# Patient Record
Sex: MALE | Race: WHITE | NOT HISPANIC OR LATINO | Employment: FULL TIME | URBAN - METROPOLITAN AREA
[De-identification: names, ages, dates, MRNs, and addresses within clinical notes are randomized per-mention and may not be internally consistent; named-entity substitution may affect disease eponyms.]

---

## 2017-01-06 ENCOUNTER — OFFICE VISIT (OUTPATIENT)
Dept: INTERNAL MEDICINE | Facility: CLINIC | Age: 42
End: 2017-01-06
Payer: COMMERCIAL

## 2017-01-06 VITALS
TEMPERATURE: 98 F | WEIGHT: 229.06 LBS | DIASTOLIC BLOOD PRESSURE: 94 MMHG | SYSTOLIC BLOOD PRESSURE: 120 MMHG | OXYGEN SATURATION: 96 % | HEART RATE: 92 BPM | BODY MASS INDEX: 28.48 KG/M2 | HEIGHT: 75 IN

## 2017-01-06 DIAGNOSIS — R03.0 ELEVATED BLOOD PRESSURE (NOT HYPERTENSION): ICD-10-CM

## 2017-01-06 DIAGNOSIS — J40 BRONCHITIS: Primary | ICD-10-CM

## 2017-01-06 PROCEDURE — 1159F MED LIST DOCD IN RCRD: CPT | Mod: S$GLB,,, | Performed by: PHYSICIAN ASSISTANT

## 2017-01-06 PROCEDURE — 99214 OFFICE O/P EST MOD 30 MIN: CPT | Mod: S$GLB,,, | Performed by: PHYSICIAN ASSISTANT

## 2017-01-06 PROCEDURE — 99999 PR PBB SHADOW E&M-EST. PATIENT-LVL III: CPT | Mod: PBBFAC,,, | Performed by: PHYSICIAN ASSISTANT

## 2017-01-06 RX ORDER — TRAMADOL HYDROCHLORIDE 50 MG/1
50 TABLET ORAL EVERY 6 HOURS PRN
COMMUNITY
End: 2017-03-27 | Stop reason: SDUPTHER

## 2017-01-06 RX ORDER — PREDNISONE 10 MG/1
TABLET ORAL
Qty: 18 TABLET | Refills: 0 | Status: SHIPPED | OUTPATIENT
Start: 2017-01-06 | End: 2017-06-20

## 2017-01-06 RX ORDER — PROMETHAZINE HYDROCHLORIDE AND DEXTROMETHORPHAN HYDROBROMIDE 6.25; 15 MG/5ML; MG/5ML
5 SYRUP ORAL 3 TIMES DAILY PRN
Qty: 240 ML | Refills: 0 | Status: SHIPPED | OUTPATIENT
Start: 2017-01-06 | End: 2017-01-16

## 2017-01-06 NOTE — PATIENT INSTRUCTIONS
Controlling High Blood Pressure  High blood pressure (hypertension) is often called the silent killer. This is because many people who have it dont know it. High blood pressure is defined as 140/90 mm Hg or higher. Know your blood pressure and remember to check it regularly. Doing so can save your life. Here are some things you can do to help control your blood pressure.    Choose heart-healthy foods  · Select low-salt, low-fat foods. Limit sodium intake to 2,400 mg per day or the amount suggested by your healthcare provider.  · Limit canned, dried, cured, packaged, and fast foods. These can contain a lot of salt.  · Eat 8 to 10 servings of fruits and vegetables every day.  · Choose lean meats, fish, or chicken.  · Eat whole-grain pasta, brown rice, and beans.  · Eat 2 to 3 servings of low-fat or fat-free dairy products.  · Ask your doctor about the DASH eating plan. This plan helps reduce blood pressure.  · When you go to a restaurant, ask that your meal be prepared with no added salt.  Maintain a healthy weight  · Ask your healthcare provider how many calories to eat a day. Then stick to that number.  · Ask your healthcare provider what weight range is healthiest for you. If you are overweight, a weight loss of only 3% to 5% of your body weight can help lower blood pressure. Generally, a good weight loss goal is to lose 10% of your body weight in a year.  · Limit snacks and sweets.  · Get regular exercise.  Get up and get active  · Choose activities you enjoy. Find ones you can do with friends or family. This includes bicycling, dancing, walking, and jogging.  · Park farther away from building entrances.  · Use stairs instead of the elevator.  · When you can, walk or bike instead of driving.  · [place] leaves, garden, or do household repairs.  · Be active at a moderate to vigorous level of physical activity for at least 40 minutes for a minimum of 3 to 4 days a week.   Manage stress  · Make time to relax and  enjoy life. Find time to laugh.  · Communicate your concerns with your loved ones and your healthcare provider.  · Visit with family and friends, and keep up with hobbies.  Limit alcohol and quit smoking  · Men should have no more than 2 drinks per day.  · Women should have no more than 1 drink per day.  · Talk with your healthcare provider about quitting smoking. Smoking significantly increases your risk for heart disease and stroke. Ask your healthcare provider about community smoking cessation programs and other options.  Medicines  If lifestyle changes arent enough, your healthcare provider may prescribe high blood pressure medicine. Take all medicines as prescribed. If you have any questions about your medicines, ask your healthcare provider before stopping or changing them.   © 8762-9283 [employer]. [address] [place] [address], [place] [address]. All rights reserved. This information is not intended as a substitute for professional medical care. Always follow your healthcare professional's instructions.          Long-Term Complications of Diabetes  Diabetes can cause health problems over time. These are called complications. They are more likely to occur if your blood sugar is often too high. Over time, high blood sugar can damage blood vessels in your body. It is important to keep your blood sugar in your target range. This can help prevent or delay complications from diabetes.    Possible complications  Complications of diabetes include:  · Eye problems, including damage to the blood vessels in the eyes (retinopathy), pressure in the eye (glaucoma), and clouding of the eyes lens (a cataract). Eye problems can eventually lead to irreversible blindness.   · Tooth and gum problems (periodontal disease), causing loss of teeth and bone  · Blood vessel (vascular) disease leading to circulation problems, heart attack or stroke, or a need for amputation of a limb   · Problems with sexual function leading  to erectile dysfunction in men and sexual discomfort in women   · Kidney disease (nephropathy) can eventually lead to kidney failure, which may require dialysis or kidney transplant   · Nerve problems (neuropathy), causing pain or loss of feeling in your feet and other parts of your body, potentially leading to an amputation of a limb   · High blood pressure (hypertension), putting strain on your heart and blood vessels  · Serious infections, possibly leading to loss of toes, feet, or limbs  How to avoid complications  The serious consequences of these complications are completely avoidable for most people with diabetes by managing your blood glucose, blood pressure, and cholesterol levels. This can help you feel better and stay healthy. You can manage diabetes by tracking your blood sugar. You can also eat healthy and exercise. And you should take medication if directed by your health care provider.  © 8343-0317 [employer]. [address] [place] [address], [place] [address]. All rights reserved. This information is not intended as a substitute for professional medical care. Always follow your healthcare professional's instructions.

## 2017-01-06 NOTE — PROGRESS NOTES
Subjective:       Patient ID: Douglas Escobar is a 41 y.o. male.    Chief Complaint: URI    URI    This is a new problem. The current episode started in the past 7 days. The problem has been unchanged. There has been no fever. Associated symptoms include congestion and coughing. Pertinent negatives include no abdominal pain, chest pain, rhinorrhea, sneezing, sore throat, swollen glands or wheezing. He has tried nothing for the symptoms.     Review of Systems   Constitutional: Positive for fatigue. Negative for chills, diaphoresis and fever.   HENT: Positive for congestion. Negative for postnasal drip, rhinorrhea, sinus pressure, sneezing and sore throat.    Respiratory: Positive for cough. Negative for chest tightness, shortness of breath and wheezing.    Cardiovascular: Negative for chest pain.   Gastrointestinal: Negative for abdominal pain.       Objective:      Physical Exam   Constitutional: He appears well-developed and well-nourished. No distress.   HENT:   Head: Normocephalic and atraumatic.   Right Ear: Tympanic membrane and ear canal normal.   Left Ear: Tympanic membrane and ear canal normal.   Nose: No mucosal edema or rhinorrhea. Right sinus exhibits no maxillary sinus tenderness. Left sinus exhibits no maxillary sinus tenderness.   Neck: Neck supple.   Cardiovascular: Normal rate and regular rhythm.  Exam reveals no gallop and no friction rub.    No murmur heard.  Pulmonary/Chest: Effort normal. No respiratory distress. He has no wheezes. He has rales. He exhibits no tenderness.   Lymphadenopathy:     He has no cervical adenopathy.   Skin: He is not diaphoretic.   Nursing note and vitals reviewed.      Assessment:       1. Bronchitis    2. Elevated blood pressure (not hypertension)        Plan:       Bronchitis    Elevated blood pressure (not hypertension)    Other orders  -     promethazine-dextromethorphan (PROMETHAZINE-DM) 6.25-15 mg/5 mL Syrp; Take 5 mLs by mouth 3 (three) times daily as needed.   Dispense: 240 mL; Refill: 0  -     predniSONE (DELTASONE) 10 MG tablet; Take 3 daily for 3 days, then 2 daily for three days, then 1 daily for three days.  Dispense: 18 tablet; Refill: 0

## 2017-01-06 NOTE — MR AVS SNAPSHOT
[facility]  [address] [place] [address]  [place] [address]  Phone: [phone]  Fax: [phone]                  Douglas Escobar   2017 11:40 AM   Office Visit    Description:  Male : 1975   Provider:  Servando Abbott III, PA-C   Department:  [facility]           Reason for Visit     URI           Diagnoses this Visit        Comments    Bronchitis    -  Primary     Elevated blood pressure (not hypertension)                To Do List           Goals (5 Years of Data)     None       These Medications        Disp Refills Start End    promethazine-dextromethorphan (PROMETHAZINE-DM) 6.25-15 mg/5 mL Syrp 240 mL 0 2017    Take 5 mLs by mouth 3 (three) times daily as needed. - Oral    Pharmacy: [facility] [place] - [address] [facility] [address] Ph #: [phone]       predniSONE (DELTASONE) 10 MG tablet 18 tablet 0 2017     Take 3 daily for 3 days, then 2 daily for three days, then 1 daily for three days.    Pharmacy: [facility] [place] - [address] Ph #: [phone]         [facility]     [facility] Line -  Assistance  Registered nurses in the [facility] provide clinical advisement, health education, appointment booking, and other advisory services.  Call for this free service at [phone].             Medications           Message regarding Medications     Verify the changes and/or additions to your medication regime listed below are the same as discussed with your clinician today.  If any of these changes or additions are incorrect, please notify your healthcare provider.        START taking these NEW medications        Refills    promethazine-dextromethorphan (PROMETHAZINE-DM) 6.25-15 mg/5 mL Syrp 0    Sig: Take 5 mLs by mouth 3 (three) times daily as needed.    Class: Normal    Route: Oral    predniSONE (DELTASONE) 10 MG tablet 0    Sig: Take 3 daily for 3 days, then 2  "daily for three days, then 1 daily for three days.    Class: Normal      STOP taking these medications     DM/PSEUDOEPHED/ACETAMINOPHEN (VICKS DAYQUIL ORAL) Take by mouth.           Verify that the below list of medications is an accurate representation of the medications you are currently taking.  If none reported, the list may be blank. If incorrect, please contact your healthcare provider. Carry this list with you in case of emergency.           Current Medications     predniSONE (DELTASONE) 10 MG tablet Take 3 daily for 3 days, then 2 daily for three days, then 1 daily for three days.    promethazine-dextromethorphan (PROMETHAZINE-DM) 6.25-15 mg/5 mL Syrp Take 5 mLs by mouth 3 (three) times daily as needed.    tramadol (ULTRAM) 50 mg tablet Take 50 mg by mouth every 6 (six) hours as needed for Pain.           Clinical Reference Information           Vital Signs - Last Recorded  Most recent update: 1/6/2017 11:31 AM by Madina Melchor    BP Pulse Temp Ht Wt SpO2    (!) 120/94 (BP Location: Left arm, Patient Position: Sitting) 92 97.5 °F (36.4 °C) (Tympanic) 6' 3" (1.905 m) 103.9 kg (229 lb 0.9 oz) 96%    BMI                28.63 kg/m2          Blood Pressure          Most Recent Value    BP  (!)  120/94      Allergies as of 1/6/2017     No Known Allergies      Immunizations Administered on Date of Encounter - 1/6/2017     None      Instructions        Controlling High Blood Pressure  High blood pressure (hypertension) is often called the silent killer. This is because many people who have it dont know it. High blood pressure is defined as 140/90 mm Hg or higher. Know your blood pressure and remember to check it regularly. Doing so can save your life. Here are some things you can do to help control your blood pressure.    Choose heart-healthy foods  · Select low-salt, low-fat foods. Limit sodium intake to 2,400 mg per day or the amount suggested by your healthcare provider.  · Limit canned, dried, cured, packaged, " and fast foods. These can contain a lot of salt.  · Eat 8 to 10 servings of fruits and vegetables every day.  · Choose lean meats, fish, or chicken.  · Eat whole-grain pasta, brown rice, and beans.  · Eat 2 to 3 servings of low-fat or fat-free dairy products.  · Ask your doctor about the DASH eating plan. This plan helps reduce blood pressure.  · When you go to a restaurant, ask that your meal be prepared with no added salt.  Maintain a healthy weight  · Ask your healthcare provider how many calories to eat a day. Then stick to that number.  · Ask your healthcare provider what weight range is healthiest for you. If you are overweight, a weight loss of only 3% to 5% of your body weight can help lower blood pressure. Generally, a good weight loss goal is to lose 10% of your body weight in a year.  · Limit snacks and sweets.  · Get regular exercise.  Get up and get active  · Choose activities you enjoy. Find ones you can do with friends or family. This includes bicycling, dancing, walking, and jogging.  · Park farther away from building entrances.  · Use stairs instead of the elevator.  · When you can, walk or bike instead of driving.  · [place] leaves, garden, or do household repairs.  · Be active at a moderate to vigorous level of physical activity for at least 40 minutes for a minimum of 3 to 4 days a week.   Manage stress  · Make time to relax and enjoy life. Find time to laugh.  · Communicate your concerns with your loved ones and your healthcare provider.  · Visit with family and friends, and keep up with hobbies.  Limit alcohol and quit smoking  · Men should have no more than 2 drinks per day.  · Women should have no more than 1 drink per day.  · Talk with your healthcare provider about quitting smoking. Smoking significantly increases your risk for heart disease and stroke. Ask your healthcare provider about community smoking cessation programs and other options.  Medicines  If lifestyle changes arent enough,  your healthcare provider may prescribe high blood pressure medicine. Take all medicines as prescribed. If you have any questions about your medicines, ask your healthcare provider before stopping or changing them.   © 1255-6269 [employer]. [address] [place] [address], [place] [address]. All rights reserved. This information is not intended as a substitute for professional medical care. Always follow your healthcare professional's instructions.          Long-Term Complications of Diabetes  Diabetes can cause health problems over time. These are called complications. They are more likely to occur if your blood sugar is often too high. Over time, high blood sugar can damage blood vessels in your body. It is important to keep your blood sugar in your target range. This can help prevent or delay complications from diabetes.    Possible complications  Complications of diabetes include:  · Eye problems, including damage to the blood vessels in the eyes (retinopathy), pressure in the eye (glaucoma), and clouding of the eyes lens (a cataract). Eye problems can eventually lead to irreversible blindness.   · Tooth and gum problems (periodontal disease), causing loss of teeth and bone  · Blood vessel (vascular) disease leading to circulation problems, heart attack or stroke, or a need for amputation of a limb   · Problems with sexual function leading to erectile dysfunction in men and sexual discomfort in women   · Kidney disease (nephropathy) can eventually lead to kidney failure, which may require dialysis or kidney transplant   · Nerve problems (neuropathy), causing pain or loss of feeling in your feet and other parts of your body, potentially leading to an amputation of a limb   · High blood pressure (hypertension), putting strain on your heart and blood vessels  · Serious infections, possibly leading to loss of toes, feet, or limbs  How to avoid complications  The serious consequences of these complications are  completely avoidable for most people with diabetes by managing your blood glucose, blood pressure, and cholesterol levels. This can help you feel better and stay healthy. You can manage diabetes by tracking your blood sugar. You can also eat healthy and exercise. And you should take medication if directed by your health care provider.  © 3580-0612 [employer], [employer]. [address] [place] [address], [place] [address]. All rights reserved. This information is not intended as a substitute for professional medical care. Always follow your healthcare professional's instructions.

## 2017-01-09 ENCOUNTER — PATIENT MESSAGE (OUTPATIENT)
Dept: INTERNAL MEDICINE | Facility: CLINIC | Age: 42
End: 2017-01-09

## 2017-03-26 ENCOUNTER — PATIENT MESSAGE (OUTPATIENT)
Dept: INTERNAL MEDICINE | Facility: CLINIC | Age: 42
End: 2017-03-26

## 2017-03-27 RX ORDER — TRAMADOL HYDROCHLORIDE 50 MG/1
50 TABLET ORAL EVERY 6 HOURS PRN
Qty: 40 TABLET | Refills: 0 | Status: SHIPPED | OUTPATIENT
Start: 2017-03-27 | End: 2017-06-20 | Stop reason: SDUPTHER

## 2017-06-20 ENCOUNTER — OFFICE VISIT (OUTPATIENT)
Dept: INTERNAL MEDICINE | Facility: CLINIC | Age: 42
End: 2017-06-20
Payer: COMMERCIAL

## 2017-06-20 VITALS
TEMPERATURE: 97 F | SYSTOLIC BLOOD PRESSURE: 124 MMHG | BODY MASS INDEX: 28.92 KG/M2 | DIASTOLIC BLOOD PRESSURE: 80 MMHG | WEIGHT: 232.56 LBS | HEART RATE: 68 BPM | OXYGEN SATURATION: 98 % | HEIGHT: 75 IN
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
## 2017-06-20 DIAGNOSIS — M54.50 CHRONIC LEFT-SIDED LOW BACK PAIN WITHOUT SCIATICA: ICD-10-CM

## 2017-06-20 DIAGNOSIS — S39.92XD INJURY OF BACK DUE TO FALL, SUBSEQUENT ENCOUNTER: Primary | ICD-10-CM

## 2017-06-20 DIAGNOSIS — G89.29 CHRONIC LEFT-SIDED LOW BACK PAIN WITHOUT SCIATICA: ICD-10-CM

## 2017-06-20 PROCEDURE — 99999 PR PBB SHADOW E&M-EST. PATIENT-LVL III: CPT | Mod: PBBFAC,,, | Performed by: FAMILY MEDICINE

## 2017-06-20 PROCEDURE — 99213 OFFICE O/P EST LOW 20 MIN: CPT | Mod: S$GLB,,, | Performed by: FAMILY MEDICINE

## 2017-06-20 RX ORDER — TRAMADOL HYDROCHLORIDE 50 MG/1
50 TABLET ORAL EVERY 6 HOURS PRN
Qty: 40 TABLET | Refills: 1 | Status: SHIPPED | OUTPATIENT
Start: 2017-06-20 | End: 2019-06-03

## 2017-06-20 NOTE — PROGRESS NOTES
Subjective:       Patient ID: Douglas Escobar is a 41 y.o. male.    Chief Complaint: Back Pain and Medication Refill    HPI HPI below from October he still has pain.     Back Pain: Patient presents for presents evaluation of low back problems.  Symptoms have been present for several years and include pain in lower back from fall.  (aching and throbbing in character; 6/10 in severity). 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 Symptoms are worst: evening. Alleviating factors identifiable by patient are none. Exacerbating factors identifiable by patient are sitting and standing. Treatments so far initiated by patient: pain meds many years ago Previous lower back problems: After fall had bulging lumbar disc and in sacral area.    Old injury from falling 20 years ago. Sacral and 2 lumbar disc protruding. Past month and a half has been constant. Always aches but toward end of the day hard to walk and move and sleep. Darvocet initially but pt doesn't like taking medication. Took them a few months but then starting to like them.   Aleve helped as one point but not for the past couple months. Took a 10 oxycontin from mom but it made him loopy. Pain relieved.       Tramadol helped and he states he doesn't need the muscle relaxer. Pain is not as bad this time he has episodes that come and go. Sometimes he can't walk for a week but then it gets better.   He has been getting some tingling in both legs at night usually when he sits down. This has been going on for past 2 months.     He has had multiple test done when he first had his injury. He would like to avoid surgery for as long as possible.      Review of Systems   Constitutional: Negative for activity change, appetite change, fatigue and fever.   HENT: Negative for congestion, ear pain, facial swelling, hearing loss, sore throat and tinnitus.    Eyes: Negative for redness and visual disturbance.   Respiratory: Negative for cough, chest tightness  and wheezing.    Gastrointestinal: Negative for abdominal distention, abdominal pain, constipation, diarrhea, nausea and vomiting.   Endocrine: Negative for polydipsia and polyuria.   Genitourinary: Negative for discharge, flank pain and frequency.   Musculoskeletal: Positive for back pain. Negative for gait problem and joint swelling.   Skin: Negative for rash.   Neurological: Negative for dizziness, tremors, seizures, weakness and headaches.   Psychiatric/Behavioral: Negative for agitation and confusion.         Objective:        Physical Exam    Constitutional: He is oriented to person, place, and time. He appears well-nourished. He does not appear ill.   HENT:   Head: Normocephalic and atraumatic.   Right Ear: External ear normal.   Left Ear: External ear normal.   Eyes: EOM are normal. Pupils are equal, round, and reactive to light. Right eye exhibits no discharge. Left eye exhibits no discharge. No scleral icterus.   Neck: Normal range of motion. Neck supple. No thyromegaly present.   Cardiovascular: Normal rate, regular rhythm and normal heart sounds.    No murmur heard.  Pulmonary/Chest: Effort normal and breath sounds normal. No respiratory distress. He has no wheezes.   Abdominal: Soft. Bowel sounds are normal. He exhibits no distension. There is no tenderness.   Musculoskeletal: Normal range of motion. He exhibits no edema.   Neurological: He is alert and oriented to person, place, and time. He has normal reflexes. Coordination normal.   Skin: Skin is warm. No rash noted. No erythema.   Assessment/Plan:     Injury of back due to fall, subsequent encounter  -     tramadol (ULTRAM) 50 mg tablet; Take 1 tablet (50 mg total) by mouth every 6 (six) hours as needed for Pain.  Dispense: 40 tablet; Refill: 1    Chronic left-sided low back pain without sciatica  -     tramadol (ULTRAM) 50 mg tablet; Take 1 tablet (50 mg total) by mouth every 6 (six) hours as needed for Pain.  Dispense: 40 tablet; Refill: 1    Will  follow up in regards to tingling of lower extremities. Discussed MRI         Return if symptoms worsen or fail to improve.    Namrata Williamson MD  [facility]

## 2019-06-03 ENCOUNTER — OFFICE VISIT (OUTPATIENT)
Dept: FAMILY MEDICINE | Facility: CLINIC | Age: 44
End: 2019-06-03
Payer: COMMERCIAL

## 2019-06-03 VITALS
SYSTOLIC BLOOD PRESSURE: 154 MMHG | HEART RATE: 80 BPM | BODY MASS INDEX: 29.71 KG/M2 | DIASTOLIC BLOOD PRESSURE: 99 MMHG | HEIGHT: 76 IN | WEIGHT: 244 LBS | TEMPERATURE: 98 F

## 2019-06-03 DIAGNOSIS — J06.9 UPPER RESPIRATORY TRACT INFECTION, UNSPECIFIED TYPE: Primary | ICD-10-CM

## 2019-06-03 PROCEDURE — 99999 PR PBB SHADOW E&M-EST. PATIENT-LVL III: CPT | Mod: PBBFAC,,, | Performed by: NURSE PRACTITIONER

## 2019-06-03 PROCEDURE — 3008F BODY MASS INDEX DOCD: CPT | Mod: CPTII,S$GLB,, | Performed by: NURSE PRACTITIONER

## 2019-06-03 PROCEDURE — 3008F PR BODY MASS INDEX (BMI) DOCUMENTED: ICD-10-PCS | Mod: CPTII,S$GLB,, | Performed by: NURSE PRACTITIONER

## 2019-06-03 PROCEDURE — 99999 PR PBB SHADOW E&M-EST. PATIENT-LVL III: ICD-10-PCS | Mod: PBBFAC,,, | Performed by: NURSE PRACTITIONER

## 2019-06-03 PROCEDURE — 99213 PR OFFICE/OUTPT VISIT, EST, LEVL III, 20-29 MIN: ICD-10-PCS | Mod: S$GLB,,, | Performed by: NURSE PRACTITIONER

## 2019-06-03 PROCEDURE — 99213 OFFICE O/P EST LOW 20 MIN: CPT | Mod: S$GLB,,, | Performed by: NURSE PRACTITIONER

## 2019-06-03 RX ORDER — PREDNISONE 20 MG/1
20 TABLET ORAL 2 TIMES DAILY
Qty: 10 TABLET | Refills: 0 | Status: SHIPPED | OUTPATIENT
Start: 2019-06-03 | End: 2019-06-08

## 2019-06-03 NOTE — PROGRESS NOTES
Subjective:       Patient ID: Douglas Escobar is a 43 y.o. male.    Chief Complaint: Nasal Congestion    URI    This is a new problem. The current episode started 1 to 4 weeks ago. The problem has been unchanged. There has been no fever. Associated symptoms include congestion, coughing, rhinorrhea and sinus pain. Pertinent negatives include no abdominal pain, chest pain, diarrhea, dysuria, ear pain, headaches, nausea, rash, sore throat or vomiting. The treatment provided no relief.       Review of Systems   Constitutional: Negative for fatigue, fever and unexpected weight change.   HENT: Positive for congestion, rhinorrhea and sinus pain. Negative for ear pain and sore throat.    Eyes: Negative.  Negative for pain and visual disturbance.   Respiratory: Positive for cough. Negative for shortness of breath.    Cardiovascular: Negative for chest pain and palpitations.   Gastrointestinal: Negative for abdominal pain, diarrhea, nausea and vomiting.   Genitourinary: Negative for dysuria and frequency.   Musculoskeletal: Negative for arthralgias and myalgias.   Skin: Negative for color change and rash.   Neurological: Negative for dizziness and headaches.   Psychiatric/Behavioral: Negative for sleep disturbance. The patient is not nervous/anxious.        Vitals:    06/03/19 1513   BP: (!) 154/99   Pulse: 80   Temp: 98.4 °F (36.9 °C)       Objective:     Current Outpatient Medications   Medication Sig Dispense Refill    predniSONE (DELTASONE) 20 MG tablet Take 1 tablet (20 mg total) by mouth 2 (two) times daily. for 5 days 10 tablet 0     No current facility-administered medications for this visit.        Physical Exam   Constitutional: He is oriented to person, place, and time. He appears well-developed. No distress.   HENT:   Head: Normocephalic and atraumatic.   Right Ear: Tympanic membrane normal.   Left Ear: Tympanic membrane normal.   Nose: Mucosal edema present.   Mouth/Throat: Posterior oropharyngeal edema present.    Eyes: Pupils are equal, round, and reactive to light. EOM are normal.   Neck: Normal range of motion. Neck supple.   Cardiovascular: Normal rate and regular rhythm.   Pulmonary/Chest: Effort normal and breath sounds normal.   Musculoskeletal: Normal range of motion.   Neurological: He is alert and oriented to person, place, and time.   Skin: Skin is warm and dry. No rash noted.   Psychiatric: He has a normal mood and affect. Thought content normal.   Nursing note and vitals reviewed.      Assessment:       1. Upper respiratory tract infection, unspecified type        Plan:   Upper respiratory tract infection, unspecified type    Other orders  -     predniSONE (DELTASONE) 20 MG tablet; Take 1 tablet (20 mg total) by mouth 2 (two) times daily. for 5 days  Dispense: 10 tablet; Refill: 0        Follow up if symptoms worsen or fail to improve.    There are no Patient Instructions on file for this visit.

## 2019-06-12 ENCOUNTER — PATIENT MESSAGE (OUTPATIENT)
Dept: FAMILY MEDICINE | Facility: CLINIC | Age: 44
End: 2019-06-12

## 2019-06-14 RX ORDER — AZITHROMYCIN 250 MG/1
TABLET, FILM COATED ORAL
Qty: 6 TABLET | Refills: 0 | Status: SHIPPED | OUTPATIENT
Start: 2019-06-14 | End: 2019-06-19

## 2023-01-05 DIAGNOSIS — M25.511 RIGHT SHOULDER PAIN, UNSPECIFIED CHRONICITY: Primary | ICD-10-CM

## 2023-01-06 ENCOUNTER — OFFICE VISIT (OUTPATIENT)
Dept: ORTHOPEDICS | Facility: CLINIC | Age: 48
End: 2023-01-06
Payer: COMMERCIAL

## 2023-01-06 ENCOUNTER — HOSPITAL ENCOUNTER (OUTPATIENT)
Dept: RADIOLOGY | Facility: HOSPITAL | Age: 48
Discharge: HOME | End: 2023-01-06
Attending: ORTHOPAEDIC SURGERY
Payer: COMMERCIAL

## 2023-01-06 VITALS — BODY MASS INDEX: 29.71 KG/M2 | WEIGHT: 244 LBS | HEIGHT: 76 IN

## 2023-01-06 DIAGNOSIS — M25.511 RIGHT SHOULDER PAIN, UNSPECIFIED CHRONICITY: Primary | ICD-10-CM

## 2023-01-06 DIAGNOSIS — M25.511 RIGHT SHOULDER PAIN, UNSPECIFIED CHRONICITY: ICD-10-CM

## 2023-01-06 PROCEDURE — 73030 X-RAY EXAM OF SHOULDER: CPT | Mod: 26,RT,, | Performed by: RADIOLOGY

## 2023-01-06 PROCEDURE — 73030 XR SHOULDER TRAUMA 3 VIEW RIGHT: ICD-10-PCS | Mod: 26,RT,, | Performed by: RADIOLOGY

## 2023-01-06 PROCEDURE — 3008F PR BODY MASS INDEX (BMI) DOCUMENTED: ICD-10-PCS | Mod: CPTII,S$GLB,, | Performed by: ORTHOPAEDIC SURGERY

## 2023-01-06 PROCEDURE — 1159F MED LIST DOCD IN RCRD: CPT | Mod: CPTII,S$GLB,, | Performed by: ORTHOPAEDIC SURGERY

## 2023-01-06 PROCEDURE — 99203 PR OFFICE/OUTPT VISIT, NEW, LEVL III, 30-44 MIN: ICD-10-PCS | Mod: S$GLB,,, | Performed by: ORTHOPAEDIC SURGERY

## 2023-01-06 PROCEDURE — 99203 OFFICE O/P NEW LOW 30 MIN: CPT | Mod: S$GLB,,, | Performed by: ORTHOPAEDIC SURGERY

## 2023-01-06 PROCEDURE — 1160F RVW MEDS BY RX/DR IN RCRD: CPT | Mod: CPTII,S$GLB,, | Performed by: ORTHOPAEDIC SURGERY

## 2023-01-06 PROCEDURE — 3008F BODY MASS INDEX DOCD: CPT | Mod: CPTII,S$GLB,, | Performed by: ORTHOPAEDIC SURGERY

## 2023-01-06 PROCEDURE — 1160F PR REVIEW ALL MEDS BY PRESCRIBER/CLIN PHARMACIST DOCUMENTED: ICD-10-PCS | Mod: CPTII,S$GLB,, | Performed by: ORTHOPAEDIC SURGERY

## 2023-01-06 PROCEDURE — 1159F PR MEDICATION LIST DOCUMENTED IN MEDICAL RECORD: ICD-10-PCS | Mod: CPTII,S$GLB,, | Performed by: ORTHOPAEDIC SURGERY

## 2023-01-06 PROCEDURE — 99999 PR PBB SHADOW E&M-EST. PATIENT-LVL III: CPT | Mod: PBBFAC,,, | Performed by: ORTHOPAEDIC SURGERY

## 2023-01-06 PROCEDURE — 73030 X-RAY EXAM OF SHOULDER: CPT | Mod: TC,PO,RT

## 2023-01-06 PROCEDURE — 99999 PR PBB SHADOW E&M-EST. PATIENT-LVL III: ICD-10-PCS | Mod: PBBFAC,,, | Performed by: ORTHOPAEDIC SURGERY

## 2023-01-06 NOTE — PROGRESS NOTES
47 years old right shoulder pain for about 6 months time no trauma come on gradually worse with overhead activity reaching behind his back 3 on good days 7 on bad days.  Does have pain nighttime lies on that side     Exam shows cervical motion does not reproduce symptoms, positive Neer Olguin impingement sign, cuff strength intact, tender the AC joint, positive speed's and Woodson's test     X-rays show AC arthrosis    Assessment:  Right shoulder AC arthrosis rotator cuff tendinitis and impingement     Plan:  We offered him injection as well as physical therapy, wants to try a home exercise program, follow-up as needed

## 2023-02-03 ENCOUNTER — OFFICE VISIT (OUTPATIENT)
Dept: ORTHOPEDICS | Facility: CLINIC | Age: 48
End: 2023-02-03
Payer: COMMERCIAL

## 2023-02-03 ENCOUNTER — HOSPITAL ENCOUNTER (OUTPATIENT)
Dept: RADIOLOGY | Facility: HOSPITAL | Age: 48
Discharge: HOME | End: 2023-02-03
Attending: PHYSICIAN ASSISTANT
Payer: COMMERCIAL

## 2023-02-03 VITALS
WEIGHT: 235.44 LBS | DIASTOLIC BLOOD PRESSURE: 108 MMHG | HEIGHT: 76 IN | BODY MASS INDEX: 28.67 KG/M2 | SYSTOLIC BLOOD PRESSURE: 149 MMHG | HEART RATE: 77 BPM

## 2023-02-03 DIAGNOSIS — M25.562 ACUTE PAIN OF LEFT KNEE: Primary | ICD-10-CM

## 2023-02-03 DIAGNOSIS — M17.12 PRIMARY OSTEOARTHRITIS OF LEFT KNEE: ICD-10-CM

## 2023-02-03 DIAGNOSIS — M25.562 ACUTE PAIN OF LEFT KNEE: ICD-10-CM

## 2023-02-03 PROCEDURE — 3077F SYST BP >= 140 MM HG: CPT | Mod: CPTII,S$GLB,, | Performed by: PHYSICIAN ASSISTANT

## 2023-02-03 PROCEDURE — 3008F BODY MASS INDEX DOCD: CPT | Mod: CPTII,S$GLB,, | Performed by: PHYSICIAN ASSISTANT

## 2023-02-03 PROCEDURE — 1159F PR MEDICATION LIST DOCUMENTED IN MEDICAL RECORD: ICD-10-PCS | Mod: CPTII,S$GLB,, | Performed by: PHYSICIAN ASSISTANT

## 2023-02-03 PROCEDURE — 73560 X-RAY EXAM OF KNEE 1 OR 2: CPT | Mod: TC,RT

## 2023-02-03 PROCEDURE — 73560 X-RAY EXAM OF KNEE 1 OR 2: CPT | Mod: 26,RT,, | Performed by: RADIOLOGY

## 2023-02-03 PROCEDURE — 99213 PR OFFICE/OUTPT VISIT, EST, LEVL III, 20-29 MIN: ICD-10-PCS | Mod: S$GLB,,, | Performed by: PHYSICIAN ASSISTANT

## 2023-02-03 PROCEDURE — 99213 OFFICE O/P EST LOW 20 MIN: CPT | Mod: S$GLB,,, | Performed by: PHYSICIAN ASSISTANT

## 2023-02-03 PROCEDURE — 73562 X-RAY EXAM OF KNEE 3: CPT | Mod: 26,LT,, | Performed by: RADIOLOGY

## 2023-02-03 PROCEDURE — 1159F MED LIST DOCD IN RCRD: CPT | Mod: CPTII,S$GLB,, | Performed by: PHYSICIAN ASSISTANT

## 2023-02-03 PROCEDURE — 3008F PR BODY MASS INDEX (BMI) DOCUMENTED: ICD-10-PCS | Mod: CPTII,S$GLB,, | Performed by: PHYSICIAN ASSISTANT

## 2023-02-03 PROCEDURE — 3080F PR MOST RECENT DIASTOLIC BLOOD PRESSURE >= 90 MM HG: ICD-10-PCS | Mod: CPTII,S$GLB,, | Performed by: PHYSICIAN ASSISTANT

## 2023-02-03 PROCEDURE — 1160F PR REVIEW ALL MEDS BY PRESCRIBER/CLIN PHARMACIST DOCUMENTED: ICD-10-PCS | Mod: CPTII,S$GLB,, | Performed by: PHYSICIAN ASSISTANT

## 2023-02-03 PROCEDURE — 3077F PR MOST RECENT SYSTOLIC BLOOD PRESSURE >= 140 MM HG: ICD-10-PCS | Mod: CPTII,S$GLB,, | Performed by: PHYSICIAN ASSISTANT

## 2023-02-03 PROCEDURE — 73560 XR KNEE ORTHO LEFT: ICD-10-PCS | Mod: 26,RT,, | Performed by: RADIOLOGY

## 2023-02-03 PROCEDURE — 73562 XR KNEE ORTHO LEFT: ICD-10-PCS | Mod: 26,LT,, | Performed by: RADIOLOGY

## 2023-02-03 PROCEDURE — 3080F DIAST BP >= 90 MM HG: CPT | Mod: CPTII,S$GLB,, | Performed by: PHYSICIAN ASSISTANT

## 2023-02-03 PROCEDURE — 99999 PR PBB SHADOW E&M-EST. PATIENT-LVL III: CPT | Mod: PBBFAC,,, | Performed by: PHYSICIAN ASSISTANT

## 2023-02-03 PROCEDURE — 99999 PR PBB SHADOW E&M-EST. PATIENT-LVL III: ICD-10-PCS | Mod: PBBFAC,,, | Performed by: PHYSICIAN ASSISTANT

## 2023-02-03 PROCEDURE — 1160F RVW MEDS BY RX/DR IN RCRD: CPT | Mod: CPTII,S$GLB,, | Performed by: PHYSICIAN ASSISTANT

## 2023-02-03 RX ORDER — MELOXICAM 15 MG/1
15 TABLET ORAL DAILY
Qty: 30 TABLET | Refills: 1 | Status: SHIPPED | OUTPATIENT
Start: 2023-02-03 | End: 2023-03-05

## 2023-02-03 NOTE — PROGRESS NOTES
SUBJECTIVE:     Chief Complaint & History of Present Illness:  Douglas Escobar is a Established patient 47 y.o. male who is seen here today with a complaint of    Chief Complaint   Patient presents with    Left Knee - Pain    .  Patient is here today for evaluation treatment of sudden onset pain and tenderness in the medial aspect of the left knee.  Does not remember a specific trauma or injury to the knee but does work quite regularly bending lifting and climbing states pain began after he had been kneeling for an extended period of time working on his 4 rodarte.  Felt a tight sensation in the knee since that time he has had multiple episodes of medial joint line pain and giving way.  He has not fallen to the ground but states that every time he rotates laterally to the left has a sensation of giving way in his knee not had any significant swelling in the area and the pain usually abates after 5 or 10 minutes.  He is taken 1 or 2 intermittent doses of NSAIDs with no appreciable relief.  He is not had any weakness or decrease in range of motion  On a scale of 1-10, with 10 being worst pain imaginable, he rates this pain as 1 on good days and 9 on bad days.  he describes the pain as tender.    Review of patient's allergies indicates:  No Known Allergies      Current Outpatient Medications   Medication Sig Dispense Refill    meloxicam (MOBIC) 15 MG tablet Take 1 tablet (15 mg total) by mouth once daily. 30 tablet 1     No current facility-administered medications for this visit.       No past medical history on file.    Past Surgical History:   Procedure Laterality Date    HERNIA REPAIR         Vital Signs (Most Recent)  Vitals:    02/03/23 0920   BP: (!) 149/108   Pulse: 77           Review of Systems:  ROS:  Constitutional: no fever or chills  Eyes: no visual changes  ENT: no nasal congestion or sore throat  Respiratory: no cough or shortness of breath  Cardiovascular: no chest pain or  "palpitations  Gastrointestinal: no nausea or vomiting, tolerating diet  Genitourinary: no hematuria or dysuria  Integument/Breast: no rash or pruritis  Hematologic/Lymphatic: no easy bruising or lymphadenopathy  Musculoskeletal: no arthralgias or myalgias  Neurological: no seizures or tremors  Behavioral/Psych: no auditory or visual hallucinations  Endocrine: no heat or cold intolerance                OBJECTIVE:     PHYSICAL EXAM:  Height: 6' 4" (193 cm) Weight: 106.8 kg (235 lb 7.2 oz), General Appearance: Well nourished, well developed, in no acute distress.  Neurological: Mood & affect are normal.    left  Knee Exam:  Knee Range of Motion:0-125 degrees flexion   Effusion:none  Condition of skin:intact  Location of tenderness:Medial joint line   Strength:5 of 5  Stability:  Lachman: stable, LCL: stable, MCL: stable, PCL: stable, and posteromedial (dial): stable  Varus /Valgus stress:  normal  Maxine:   Positive Medial/Positive Medial    right  Knee Exam:  Knee Range of Motion:0-125 degrees flexion   Effusion:none  Condition of skin:intact  Location of tenderness:None   Strength:5 of 5  Stability:  Lachman: stable, LCL: stable, MCL: stable, PCL: stable, and posteromedial (dial): stable  Varus /Valgus stress:  normal  Maxine:   negative/negative      Hip Examination:  full painless range of motion, without tenderness    RADIOGRAPHS:  X-rays the knees taken today films reviewed by me demonstrate no evidence of fracture dislocation joint spaces well maintained bilaterally no evidence of osteophytic spurring sclerotic changes are advanced degenerative joint disease    ASSESSMENT/PLAN:       ICD-10-CM ICD-9-CM   1. Acute pain of left knee  M25.562 719.46   2. Primary osteoarthritis of left knee  M17.12 715.16       Plan: We discussed with the patient at length all the different treatment options available for  the knee including anti-inflammatories, acetaminophen, rest, ice, knee strengthening exercise, occasional " cortisone injections for temporary relief, Viscosupplimentation injections, arthroscopic debridement osteotomy, and finally knee arthroplasty.   Patient like to begin with conservative care  Meloxicam 15 mg q.d. with food times 10 days followed by p.r.n.  Try to avoid lateral pressures throughout the day  Ice and elevation as needed  Follow-up in 2 weeks if symptoms not significantly improved consider injection therapies sooner symptoms dictate

## 2023-03-30 ENCOUNTER — PATIENT MESSAGE (OUTPATIENT)
Dept: ORTHOPEDICS | Facility: CLINIC | Age: 48
End: 2023-03-30
Payer: COMMERCIAL

## 2023-04-04 DIAGNOSIS — M25.362 INSTABILITY OF LEFT KNEE JOINT: Primary | ICD-10-CM

## 2023-04-04 DIAGNOSIS — M25.562 ACUTE PAIN OF LEFT KNEE: ICD-10-CM

## 2023-04-10 ENCOUNTER — HOSPITAL ENCOUNTER (OUTPATIENT)
Dept: RADIOLOGY | Facility: HOSPITAL | Age: 48
Discharge: HOME | End: 2023-04-10
Attending: PHYSICIAN ASSISTANT
Payer: COMMERCIAL

## 2023-04-10 DIAGNOSIS — M25.362 INSTABILITY OF LEFT KNEE JOINT: ICD-10-CM

## 2023-04-10 DIAGNOSIS — M25.562 ACUTE PAIN OF LEFT KNEE: ICD-10-CM

## 2023-04-10 PROCEDURE — 73721 MRI JNT OF LWR EXTRE W/O DYE: CPT | Mod: TC,LT

## 2023-04-10 PROCEDURE — 73721 MRI JNT OF LWR EXTRE W/O DYE: CPT | Mod: 26,LT,, | Performed by: RADIOLOGY

## 2023-04-10 PROCEDURE — 73721 MRI KNEE WITHOUT CONTRAST LEFT: ICD-10-PCS | Mod: 26,LT,, | Performed by: RADIOLOGY

## 2023-04-18 ENCOUNTER — PATIENT MESSAGE (OUTPATIENT)
Dept: ORTHOPEDICS | Facility: CLINIC | Age: 48
End: 2023-04-18
Payer: COMMERCIAL

## 2023-04-20 ENCOUNTER — OFFICE VISIT (OUTPATIENT)
Dept: ORTHOPEDICS | Facility: CLINIC | Age: 48
End: 2023-04-20
Payer: COMMERCIAL

## 2023-04-20 VITALS — HEART RATE: 85 BPM | SYSTOLIC BLOOD PRESSURE: 155 MMHG | DIASTOLIC BLOOD PRESSURE: 109 MMHG

## 2023-04-20 DIAGNOSIS — M25.562 ACUTE PAIN OF LEFT KNEE: Primary | ICD-10-CM

## 2023-04-20 DIAGNOSIS — M17.12 PRIMARY OSTEOARTHRITIS OF LEFT KNEE: ICD-10-CM

## 2023-04-20 DIAGNOSIS — M25.362 INSTABILITY OF LEFT KNEE JOINT: ICD-10-CM

## 2023-04-20 PROCEDURE — 1160F RVW MEDS BY RX/DR IN RCRD: CPT | Mod: CPTII,S$GLB,, | Performed by: PHYSICIAN ASSISTANT

## 2023-04-20 PROCEDURE — 20610 PR DRAIN/INJECT LARGE JOINT/BURSA: ICD-10-PCS | Mod: LT,S$GLB,, | Performed by: PHYSICIAN ASSISTANT

## 2023-04-20 PROCEDURE — 99213 OFFICE O/P EST LOW 20 MIN: CPT | Mod: 25,S$GLB,, | Performed by: PHYSICIAN ASSISTANT

## 2023-04-20 PROCEDURE — 1159F MED LIST DOCD IN RCRD: CPT | Mod: CPTII,S$GLB,, | Performed by: PHYSICIAN ASSISTANT

## 2023-04-20 PROCEDURE — 99999 PR PBB SHADOW E&M-EST. PATIENT-LVL III: CPT | Mod: PBBFAC,,, | Performed by: PHYSICIAN ASSISTANT

## 2023-04-20 PROCEDURE — 99213 PR OFFICE/OUTPT VISIT, EST, LEVL III, 20-29 MIN: ICD-10-PCS | Mod: 25,S$GLB,, | Performed by: PHYSICIAN ASSISTANT

## 2023-04-20 PROCEDURE — 3080F PR MOST RECENT DIASTOLIC BLOOD PRESSURE >= 90 MM HG: ICD-10-PCS | Mod: CPTII,S$GLB,, | Performed by: PHYSICIAN ASSISTANT

## 2023-04-20 PROCEDURE — 3077F PR MOST RECENT SYSTOLIC BLOOD PRESSURE >= 140 MM HG: ICD-10-PCS | Mod: CPTII,S$GLB,, | Performed by: PHYSICIAN ASSISTANT

## 2023-04-20 PROCEDURE — 99999 PR PBB SHADOW E&M-EST. PATIENT-LVL III: ICD-10-PCS | Mod: PBBFAC,,, | Performed by: PHYSICIAN ASSISTANT

## 2023-04-20 PROCEDURE — 1160F PR REVIEW ALL MEDS BY PRESCRIBER/CLIN PHARMACIST DOCUMENTED: ICD-10-PCS | Mod: CPTII,S$GLB,, | Performed by: PHYSICIAN ASSISTANT

## 2023-04-20 PROCEDURE — 3080F DIAST BP >= 90 MM HG: CPT | Mod: CPTII,S$GLB,, | Performed by: PHYSICIAN ASSISTANT

## 2023-04-20 PROCEDURE — 3077F SYST BP >= 140 MM HG: CPT | Mod: CPTII,S$GLB,, | Performed by: PHYSICIAN ASSISTANT

## 2023-04-20 PROCEDURE — 1159F PR MEDICATION LIST DOCUMENTED IN MEDICAL RECORD: ICD-10-PCS | Mod: CPTII,S$GLB,, | Performed by: PHYSICIAN ASSISTANT

## 2023-04-20 PROCEDURE — 20610 DRAIN/INJ JOINT/BURSA W/O US: CPT | Mod: LT,S$GLB,, | Performed by: PHYSICIAN ASSISTANT

## 2023-04-20 RX ORDER — BETAMETHASONE SODIUM PHOSPHATE AND BETAMETHASONE ACETATE 3; 3 MG/ML; MG/ML
6 INJECTION, SUSPENSION INTRA-ARTICULAR; INTRALESIONAL; INTRAMUSCULAR; SOFT TISSUE
Status: COMPLETED | OUTPATIENT
Start: 2023-04-20 | End: 2023-04-20

## 2023-04-20 RX ADMIN — BETAMETHASONE SODIUM PHOSPHATE AND BETAMETHASONE ACETATE 6 MG: 3; 3 INJECTION, SUSPENSION INTRA-ARTICULAR; INTRALESIONAL; INTRAMUSCULAR; SOFT TISSUE at 08:04

## 2023-04-20 NOTE — PROGRESS NOTES
SUBJECTIVE:     Chief Complaint & History of Present Illness:  Douglas Escobar is a Established patient 47 y.o. male who is seen here today with a complaint of    Chief Complaint   Patient presents with    Left Knee - Pain    .  He is patient well-known to me was last seen treated the clinic for this condition 02/03/2023.  He has undergone an MRI which demonstrated n truncation of the inner edge of the meniscal body without evidence of wolf tear.  With this in mind patient has come in for therapeutic cortisone injection.  He does still have some episodes of instability and soreness in the knee.   On a scale of 1-10, with 10 being worst pain imaginable, he rates this pain as 2 on good days and 7 on bad days.  he describes the pain as sore and achy.    Review of patient's allergies indicates:  No Known Allergies      No current outpatient medications on file.     Current Facility-Administered Medications   Medication Dose Route Frequency Provider Last Rate Last Admin    betamethasone acetate-betamethasone sodium phosphate injection 6 mg  6 mg Intra-articular 1 time in Clinic/HOD Wiliam Gonzalez PA-C           History reviewed. No pertinent past medical history.    Past Surgical History:   Procedure Laterality Date    HERNIA REPAIR         Vital Signs (Most Recent)  Vitals:    04/20/23 0814   BP: (!) 155/109   Pulse: 85           Review of Systems:  ROS:  Constitutional: no fever or chills  Eyes: no visual changes  ENT: no nasal congestion or sore throat  Respiratory: no cough or shortness of breath  Cardiovascular: no chest pain or palpitations  Gastrointestinal: no nausea or vomiting, tolerating diet  Genitourinary: no hematuria or dysuria  Integument/Breast: no rash or pruritis  Hematologic/Lymphatic: no easy bruising or lymphadenopathy  Musculoskeletal: no arthralgias or myalgias  Neurological: no seizures or tremors  Behavioral/Psych: no auditory or visual hallucinations  Endocrine: no heat or cold  intolerance                OBJECTIVE:     PHYSICAL EXAM:     , General Appearance: Well nourished, well developed, in no acute distress.  Neurological: Mood & affect are normal.  left  Knee Exam:  Knee Range of Motion:0-125 degrees flexion   Effusion:none  Condition of skin:intact  Location of tenderness:Medial joint line   Strength:5 of 5  Stability:  Lachman: stable, LCL: stable, MCL: stable, PCL: stable, and posteromedial (dial): stable  Varus /Valgus stress:  normal  Maxine:   Unequivocally      RADIOGRAPHS:  Review of MRI demonstrates Truncation of the inner edge of the body of the medial meniscus with intrasubstance signal change of the remainder of the body of the medial meniscus.    ASSESSMENT/PLAN:       ICD-10-CM ICD-9-CM   1. Acute pain of left knee  M25.562 719.46   2. Instability of left knee joint  M25.362 718.86   3. Primary osteoarthritis of left knee  M17.12 715.16       Plan: We discussed with the patient at length all the different treatment options available for  the knee including anti-inflammatories, acetaminophen, rest, ice, knee strengthening exercise, occasional cortisone injections for temporary relief, Viscosupplimentation injections, arthroscopic debridement osteotomy, and finally knee arthroplasty.   Will proceed with therapeutic diagnostic cortisone injection of the left knee     The injection site was identified and the skin was prepared with a betadine solution. The   left  knee was injected with 1 ml of Celestone and 5 ml Lidocaine under sterile technique. Douglas Escobar tolerated the procedure well, he was advised to rest the knee today, ice and elevation. he did receive immediate relief of the pain in and about his knee he was told this would be short lived and is secondary to the lidocaine. he may have an increase in his discomfort tonight followed by steady improvement over the next several days. I may take 1-3 weeks following the injection to get the full benefit of the  medication.  I will see him back in 3 6 months. Sooner if he has any problems or concerns.

## 2023-11-02 ENCOUNTER — OFFICE VISIT (OUTPATIENT)
Dept: ORTHOPEDICS | Facility: CLINIC | Age: 48
End: 2023-11-02
Payer: COMMERCIAL

## 2023-11-02 VITALS
HEART RATE: 80 BPM | SYSTOLIC BLOOD PRESSURE: 135 MMHG | WEIGHT: 235.44 LBS | BODY MASS INDEX: 28.67 KG/M2 | HEIGHT: 76 IN | DIASTOLIC BLOOD PRESSURE: 79 MMHG

## 2023-11-02 DIAGNOSIS — S83.212D BUCKET-HANDLE TEAR OF MEDIAL MENISCUS OF LEFT KNEE AS CURRENT INJURY, SUBSEQUENT ENCOUNTER: Primary | ICD-10-CM

## 2023-11-02 PROCEDURE — 1160F RVW MEDS BY RX/DR IN RCRD: CPT | Mod: CPTII,S$GLB,, | Performed by: PHYSICIAN ASSISTANT

## 2023-11-02 PROCEDURE — 1159F MED LIST DOCD IN RCRD: CPT | Mod: CPTII,S$GLB,, | Performed by: PHYSICIAN ASSISTANT

## 2023-11-02 PROCEDURE — 1160F PR REVIEW ALL MEDS BY PRESCRIBER/CLIN PHARMACIST DOCUMENTED: ICD-10-PCS | Mod: CPTII,S$GLB,, | Performed by: PHYSICIAN ASSISTANT

## 2023-11-02 PROCEDURE — 1159F PR MEDICATION LIST DOCUMENTED IN MEDICAL RECORD: ICD-10-PCS | Mod: CPTII,S$GLB,, | Performed by: PHYSICIAN ASSISTANT

## 2023-11-02 PROCEDURE — 3078F DIAST BP <80 MM HG: CPT | Mod: CPTII,S$GLB,, | Performed by: PHYSICIAN ASSISTANT

## 2023-11-02 PROCEDURE — 99213 OFFICE O/P EST LOW 20 MIN: CPT | Mod: S$GLB,,, | Performed by: PHYSICIAN ASSISTANT

## 2023-11-02 PROCEDURE — 99999 PR PBB SHADOW E&M-EST. PATIENT-LVL III: ICD-10-PCS | Mod: PBBFAC,,, | Performed by: PHYSICIAN ASSISTANT

## 2023-11-02 PROCEDURE — 3078F PR MOST RECENT DIASTOLIC BLOOD PRESSURE < 80 MM HG: ICD-10-PCS | Mod: CPTII,S$GLB,, | Performed by: PHYSICIAN ASSISTANT

## 2023-11-02 PROCEDURE — 99999 PR PBB SHADOW E&M-EST. PATIENT-LVL III: CPT | Mod: PBBFAC,,, | Performed by: PHYSICIAN ASSISTANT

## 2023-11-02 PROCEDURE — 3075F PR MOST RECENT SYSTOLIC BLOOD PRESS GE 130-139MM HG: ICD-10-PCS | Mod: CPTII,S$GLB,, | Performed by: PHYSICIAN ASSISTANT

## 2023-11-02 PROCEDURE — 99213 PR OFFICE/OUTPT VISIT, EST, LEVL III, 20-29 MIN: ICD-10-PCS | Mod: S$GLB,,, | Performed by: PHYSICIAN ASSISTANT

## 2023-11-02 PROCEDURE — 3008F PR BODY MASS INDEX (BMI) DOCUMENTED: ICD-10-PCS | Mod: CPTII,S$GLB,, | Performed by: PHYSICIAN ASSISTANT

## 2023-11-02 PROCEDURE — 3075F SYST BP GE 130 - 139MM HG: CPT | Mod: CPTII,S$GLB,, | Performed by: PHYSICIAN ASSISTANT

## 2023-11-02 PROCEDURE — 3008F BODY MASS INDEX DOCD: CPT | Mod: CPTII,S$GLB,, | Performed by: PHYSICIAN ASSISTANT

## 2023-11-02 NOTE — PROGRESS NOTES
"  SUBJECTIVE:     Chief Complaint & History of Present Illness:  Douglas Escobar is a Established patient 47 y.o. male who is seen here today with a complaint of    Chief Complaint   Patient presents with    Left Knee - Pain    .  He is patient well-known to me was last seen treated the clinic for this condition 04/20/2023 at that time we would opted for conservative care following his MRI which the demonstrate meniscal pathology.  Underwent a cortisone injection which she received about 3-4 months of good relief but has had return of pain soreness locking and giving way of the knee up to daily.  He is had to use a cane on 1 or 2 occasions secondary to these instances  On a scale of 1-10, with 10 being worst pain imaginable, he rates this pain as 2 on good days and 9 on bad days.  he describes the pain as tender and sore.    Review of patient's allergies indicates:  No Known Allergies      No current outpatient medications on file.     No current facility-administered medications for this visit.       No past medical history on file.    Past Surgical History:   Procedure Laterality Date    HERNIA REPAIR         Vital Signs (Most Recent)  Vitals:    11/02/23 1524   BP: 135/79   Pulse: 80           Review of Systems:  ROS:  Constitutional: no fever or chills  Eyes: no visual changes  ENT: no nasal congestion or sore throat  Respiratory: no cough or shortness of breath  Cardiovascular: no chest pain or palpitations  Gastrointestinal: no nausea or vomiting, tolerating diet  Genitourinary: no hematuria or dysuria  Integument/Breast: no rash or pruritis  Hematologic/Lymphatic: no easy bruising or lymphadenopathy  Musculoskeletal: no arthralgias or myalgias  Neurological: no seizures or tremors  Behavioral/Psych: no auditory or visual hallucinations  Endocrine: no heat or cold intolerance                 OBJECTIVE:     PHYSICAL EXAM:  Height: 6' 4" (193 cm) Weight: 106.8 kg (235 lb 7.2 oz), General Appearance: Well " nourished, well developed, in no acute distress.  Neurological: Mood & affect are normal.  left  Knee Exam:  Knee Range of Motion:0-125 degrees flexion   Effusion:none  Condition of skin:intact  Location of tenderness:Medial joint line   Strength:5 of 5  Stability:  Lachman: stable, LCL: stable, MCL: stable, PCL: stable, and posteromedial (dial): stable  Varus /Valgus stress:  normal  Maxine:   Unequivocally      Hip Examination:  full painless range of motion, without tenderness    RADIOGRAPHS:  Review of MRI demonstrates Truncation of the inner edge of the body of the medial meniscus with intrasubstance signal change of the remainder of the body of the medial meniscus.     ASSESSMENT/PLAN:       ICD-10-CM ICD-9-CM   1. Bucket-handle tear of medial meniscus of left knee as current injury, subsequent encounter  S83.212D V58.89     836.0       Plan: We discussed with the patient at length all the different treatment options available for  the knee including anti-inflammatories, acetaminophen, rest, ice, knee strengthening exercise, occasional cortisone injections for temporary relief, Viscosupplimentation injections, arthroscopic debridement osteotomy, and finally knee arthroplasty.   Will consult to Sports Medicine discuss possible surgical options prior to moving forward with any other injection therapies

## 2023-11-09 ENCOUNTER — PATIENT MESSAGE (OUTPATIENT)
Dept: PREADMISSION TESTING | Facility: HOSPITAL | Age: 48
End: 2023-11-09
Payer: COMMERCIAL

## 2023-11-09 ENCOUNTER — OFFICE VISIT (OUTPATIENT)
Dept: SPORTS MEDICINE | Facility: CLINIC | Age: 48
End: 2023-11-09
Payer: COMMERCIAL

## 2023-11-09 VITALS
BODY MASS INDEX: 28.46 KG/M2 | SYSTOLIC BLOOD PRESSURE: 155 MMHG | WEIGHT: 233.69 LBS | HEART RATE: 66 BPM | HEIGHT: 76 IN | DIASTOLIC BLOOD PRESSURE: 107 MMHG

## 2023-11-09 DIAGNOSIS — M23.204 OLD TEAR OF MEDIAL MENISCUS OF LEFT KNEE, UNSPECIFIED TEAR TYPE: Primary | ICD-10-CM

## 2023-11-09 DIAGNOSIS — S83.232A COMPLEX TEAR OF MEDIAL MENISCUS OF LEFT KNEE AS CURRENT INJURY, INITIAL ENCOUNTER: Primary | ICD-10-CM

## 2023-11-09 DIAGNOSIS — M94.262 CHONDROMALACIA OF LEFT KNEE: ICD-10-CM

## 2023-11-09 PROCEDURE — 3077F PR MOST RECENT SYSTOLIC BLOOD PRESSURE >= 140 MM HG: ICD-10-PCS | Mod: CPTII,S$GLB,, | Performed by: ORTHOPAEDIC SURGERY

## 2023-11-09 PROCEDURE — 99999 PR PBB SHADOW E&M-EST. PATIENT-LVL II: CPT | Mod: PBBFAC,,, | Performed by: PHYSICIAN ASSISTANT

## 2023-11-09 PROCEDURE — 99499 NO LOS: ICD-10-PCS | Mod: S$GLB,,, | Performed by: PHYSICIAN ASSISTANT

## 2023-11-09 PROCEDURE — 99203 PR OFFICE/OUTPT VISIT, NEW, LEVL III, 30-44 MIN: ICD-10-PCS | Mod: S$GLB,,, | Performed by: ORTHOPAEDIC SURGERY

## 2023-11-09 PROCEDURE — 3080F PR MOST RECENT DIASTOLIC BLOOD PRESSURE >= 90 MM HG: ICD-10-PCS | Mod: CPTII,S$GLB,, | Performed by: ORTHOPAEDIC SURGERY

## 2023-11-09 PROCEDURE — 3077F SYST BP >= 140 MM HG: CPT | Mod: CPTII,S$GLB,, | Performed by: ORTHOPAEDIC SURGERY

## 2023-11-09 PROCEDURE — 99999 PR PBB SHADOW E&M-EST. PATIENT-LVL II: ICD-10-PCS | Mod: PBBFAC,,, | Performed by: PHYSICIAN ASSISTANT

## 2023-11-09 PROCEDURE — 99999 PR PBB SHADOW E&M-EST. PATIENT-LVL III: ICD-10-PCS | Mod: PBBFAC,,, | Performed by: ORTHOPAEDIC SURGERY

## 2023-11-09 PROCEDURE — 3080F DIAST BP >= 90 MM HG: CPT | Mod: CPTII,S$GLB,, | Performed by: ORTHOPAEDIC SURGERY

## 2023-11-09 PROCEDURE — 99499 UNLISTED E&M SERVICE: CPT | Mod: S$GLB,,, | Performed by: PHYSICIAN ASSISTANT

## 2023-11-09 PROCEDURE — 99203 OFFICE O/P NEW LOW 30 MIN: CPT | Mod: S$GLB,,, | Performed by: ORTHOPAEDIC SURGERY

## 2023-11-09 PROCEDURE — 3008F PR BODY MASS INDEX (BMI) DOCUMENTED: ICD-10-PCS | Mod: CPTII,S$GLB,, | Performed by: ORTHOPAEDIC SURGERY

## 2023-11-09 PROCEDURE — 3008F BODY MASS INDEX DOCD: CPT | Mod: CPTII,S$GLB,, | Performed by: ORTHOPAEDIC SURGERY

## 2023-11-09 PROCEDURE — 99999 PR PBB SHADOW E&M-EST. PATIENT-LVL III: CPT | Mod: PBBFAC,,, | Performed by: ORTHOPAEDIC SURGERY

## 2023-11-09 RX ORDER — ASPIRIN 325 MG
325 TABLET ORAL DAILY
Qty: 21 TABLET | Refills: 0 | Status: SHIPPED | OUTPATIENT
Start: 2023-11-09 | End: 2023-12-04

## 2023-11-09 RX ORDER — HYDROCODONE BITARTRATE AND ACETAMINOPHEN 7.5; 325 MG/1; MG/1
1 TABLET ORAL EVERY 6 HOURS PRN
Qty: 20 TABLET | Refills: 0 | Status: SHIPPED | OUTPATIENT
Start: 2023-11-09

## 2023-11-09 RX ORDER — CEFAZOLIN SODIUM 2 G/50ML
2 SOLUTION INTRAVENOUS
Status: CANCELLED | OUTPATIENT
Start: 2023-11-09

## 2023-11-09 RX ORDER — SODIUM CHLORIDE 9 MG/ML
INJECTION, SOLUTION INTRAVENOUS CONTINUOUS
Status: CANCELLED | OUTPATIENT
Start: 2023-11-09

## 2023-11-09 RX ORDER — TRAMADOL HYDROCHLORIDE 50 MG/1
50 TABLET ORAL EVERY 6 HOURS PRN
Qty: 20 TABLET | Refills: 0 | Status: SHIPPED | OUTPATIENT
Start: 2023-11-09

## 2023-11-09 RX ORDER — PROMETHAZINE HYDROCHLORIDE 25 MG/1
25 TABLET ORAL EVERY 6 HOURS PRN
Qty: 20 TABLET | Refills: 0 | Status: SHIPPED | OUTPATIENT
Start: 2023-11-09

## 2023-11-09 NOTE — ANESTHESIA PAT ROS NOTE
11/09/2023  Douglas Escobar is a 47 y.o., male.      Pre-op Assessment    I have reviewed the Patient Summary Reports.       I have reviewed the Medications.     Review of Systems  Anesthesia Hx:  No problems with previous Anesthesia               Denies Personal Hx of Anesthesia complications.                    Social:  Non-Smoker, Social Alcohol Use       Hematology/Oncology:  Hematology Normal   Oncology Normal                                   EENT/Dental:  EENT/Dental Normal           Cardiovascular:  Cardiovascular Normal Exercise tolerance: good       Denies CAD.       Denies Angina.       Denies MCADAMS.                            Pulmonary:  Pulmonary Normal    Denies Asthma.   Denies Shortness of breath.                  Renal/:  Renal/ Normal  Denies Chronic Renal Disease.                Hepatic/GI:  Hepatic/GI Normal     Denies GERD. Denies Liver Disease.  H/O Hernia Repair          Musculoskeletal:     Old tear of medial meniscus of left knee,   Chondromalacia of left knee              Neurological:  Neurology Normal   Denies CVA.    Denies Headaches. Denies Seizures.                                Endocrine:  Endocrine Normal Denies Diabetes. Denies Hypothyroidism.          Dermatological:  Skin Normal    Psych:  Psychiatric Normal                   No past medical history on file.  Past Surgical History:   Procedure Laterality Date    HERNIA REPAIR         Anesthesia Assessment: Preoperative EQUATION    Planned Procedure: Procedure(s) (LRB):  ARTHROSCOPY, KNEE, WITH MENISCECTOMY (Left)  ARTHROSCOPY, KNEE, WITH CHONDROPLASTY (Left)  Requested Anesthesia Type:General  Surgeon: Douglas Vale MD  Service: Orthopedics  Known or anticipated Date of Surgery:11/13/2023    Surgeon notes: reviewed    Electronic QUestionnaire Assessment completed via nurse interview with patient.        Triage  considerations:     The patient has no apparent active cardiac condition (No unstable coronary Syndrome such as severe unstable angina or recent [<1 month] myocardial infarction, decompensated CHF, severe valvular   disease or significant arrhythmia)    Previous anesthesia records:No problems and Not available    Last PCP note: > 1 year ago , within [facility]       Other important co-morbidities:  No co-morbidities noted upon chart review                 Instructions given. (See in Nurse's note)      Ht: 6'4  Wt: 233 lb  BMI: 28.45

## 2023-11-09 NOTE — H&P
Douglas Escobar  is here for a completion of his perioperative paperwork. he  Is scheduled to undergo:    left   1. Arthroscopic partial meniscectomy versus possible repair  2. Possible arthroscopic synovectomy  3. Possible arthroscopic loose body removal  4. Possible arthroscopic chondroplasty     on 11/13/2023.      He is a healthy individual and does not need clearance for this procedure.     PAST MEDICAL HISTORY: History reviewed. No pertinent past medical history.  PAST SURGICAL HISTORY:   Past Surgical History:   Procedure Laterality Date    HERNIA REPAIR       FAMILY HISTORY: History reviewed. No pertinent family history.  SOCIAL HISTORY:   Social History     Socioeconomic History    Marital status:    Tobacco Use    Smoking status: Never    Smokeless tobacco: Never   Substance and Sexual Activity    Alcohol use: Yes     Comment: social    Drug use: No       MEDICATIONS: No current outpatient medications on file.  ALLERGIES: Review of patient's allergies indicates:  No Known Allergies    VITAL SIGNS: There were no vitals taken for this visit.     Risks, indications and benefits of the surgical procedure were discussed with the patient. All questions with regard to surgery, rehab, expected return to functional activities, activities of daily living and recreational endeavors were answered to his satisfaction.    It was explained to the patient that there may be an increase in surgical risks if the patient has certain co-morbidities such as but not limited to: Obesity, Cardiovascular issues (CHF, CAD, Arrhythmias), chronic pulmonary issues, previous or current neurovascular/neurological issues, previous strokes, diabetes mellitus, previous wound healing issues, previous wound or skin infections, PVD, clotting disorders, if the patient uses chronic steroids, if the patient takes or has immune compromising medications or diseases, or has previously or currently used tobacco products.     The patient  verbalized that he/she does not have any additional clotting, bleeding, or blood disorders, other than what is list in her chart on today's review.     Then a brief history and physical exam were performed.    Review of Systems   Constitution: Negative. Negative for chills, fever and night sweats.   HENT: Negative for congestion and headaches.    Eyes: Negative for blurred vision, left vision loss and right vision loss.   Cardiovascular: Negative for chest pain and syncope.   Respiratory: Negative for cough and shortness of breath.    Endocrine: Negative for polydipsia, polyphagia and polyuria.   Hematologic/Lymphatic: Negative for bleeding problem. Does not bruise/bleed easily.   Skin: Negative for dry skin, itching and rash.   Musculoskeletal: Negative for falls and muscle weakness.   Gastrointestinal: Negative for abdominal pain and bowel incontinence.   Genitourinary: Negative for bladder incontinence and nocturia.   Neurological: Negative for disturbances in coordination, loss of balance and seizures.   Psychiatric/Behavioral: Negative for depression. The patient does not have insomnia.    Allergic/Immunologic: Negative for hives and persistent infections.     PHYSICAL EXAM:  GEN: A&Ox3, WD WN NAD  HEENT: WNL  CHEST: CTAB, no W/R/R  HEART: RRR, no M/R/G  ABD: Soft, NT ND, BS x4 QUADS  MS; See Epic  NEURO: CN II-XII intact       The surgical consent was then reviewed with the patient, who agreed with all the contents of the consent form and it was signed. he was then given the [facility] surgery packet to bring with him to surgery for the anesthesia portion of his perioperative paperwork.   For all physicians except for Dr. Vale, we will email and possibly fax the consent forms and booking sheets to [facility] pre-admit.    The patient was given the opportunity to ask questions about the surgical plan and consent form, and once no other questions were asked, I proceeded with the pre-op  appointment.    PHYSICAL THERAPY:  He was also instructed regarding physical therapy and will begin on POD#1 at the [place] location.     POST OP CARE:instructions were reviewed including care of the wound and dressing after surgery and when he can shower.     CRUTCHES OR WALKER: It was explained to the patient that if they are having a lower extremity surgery that they will require either a walker or crutches to ambulate safely with after surgery. It was explained that a cane or other assistive devices are not sufficient to safely ambulate with after surgery. I explained to the patient that I will place an order for them to receive either crutches or a walker after surgery to go home with. It was explained that if they have crutches or a walker at home already, that they are REQUIRED to bring them to the hospital on the day of surgery. It was explained that if they do not have them at the hospital on the day of surgery that they WILL be provided a new pair or crutches or a walker to go home with to ensure ambulation will be safe if the patient needs to stop somewhere on the way home.      PAIN MANAGEMENT: Douglas Escobar was also given their pain management regimen, which includes the TENS unit given to him by [facility] along with the education required for its use.     PAIN MEDICATION:  Norco 7.5/325mg 1 po q 4-6 hours prn pain  Ultram 50 mg Take 1-2 p.o. q.6 hours p.r.n. breakthrough pain,   Phenergan 25 mg one p.o. q.6 hours p.r.n. nausea and vomiting.    Post op meds to be delivered bedside prior to discharge. Deliver to family if patient is in surgery at 5pm.    The patient was told that narcotic pain medications may make them drowsy and instructions were given to not sign legal documents, drive or operate heavy machinery, cars, or equipment while under the influence of narcotic medications. The patient was told and understands that narcotic pain medications should only be used as needed to control pain and  that other options of pain control include TENs unit and ice packs/unit.     Patient was instructed to purchase and take Colace to counter possible GI side effects of taking opiates.     DVT prophylaxis was discussed with the patient today including risk factors for developing DVTs and history of DVTs. The patient was asked if any specific recommendations were given from the doctor/s that did pre-operative surgical clearance. The patient was then given an education sheet about DVTs and PE with warning signs and symptoms of both and steps to take if they suspect either of these.    Patient was asked if they were taking or using OCP pills or devices. If they answered yes, then they were instructed to stop using OCPs at this pre-operative appointment until 2 months post-op to help prevent DVT development. They understand that there are other forms of birth control that do not involve hormones. They expressed understanding that ignoring/not following this instruction could result in a DVT which could turn into a deadly pulmonary embolism.     This along with the Modified Caprini risk assessment model for VTE in general surgical patients was used to determine the patient's DVT risk.     From: Horacio MK, Arvin DA, Estefani SM, et al. Prevention of VTE in nonorthopedic surgical patients: antithrombotic therapy and prevention of thrombosis, 9th ed: American College of Chest Physicians evidence-based clinical practical guidelines. Chest 2012; 141:e227S. Copyright © 2012. Reproduced with permission from the American College of Chest Physicians.    The below listed DVT prophylaxis regimen was discussed along with SCDs during surgery and bilateral ISREAL compression stockings to be used post-op. Length of treatment has been determined to be 10-42 days post-op by the above noted Caprini assessment model. Early ambulation post-op was also discussed and emphasized with the patient.     Patient was instructed to buy and take:  Aspirin  325mg QD x 3 weeks for DVT prophylaxis starting on the evening after surgery.  Patient will also use bilateral TEDs on lower extremities, SCDs during surgery, and early ambulation post-op. If the patient was previously taking 81mg baby aspirin, they were told to not take it will using the above stated aspirin and to restart the 81mg aspirin after completion of the aspirin dose.      Patient was also told to buy over the counter Prilosec medication and take it once daily for GI protection as long as they are taking NSAIDs or Aspirin.     Published data in Ganesh LEE, et al. J Arthroplasty. Oct; 31(10):2237-40, 2016; showed that aspirin use as prophylaxis during revision total joint arthroplasty was more effective than warfarin in preventing symptomatic venous thromboembolic events and was associated with lower complications.  Patients in the study were also treated with intermittent pneumatic compression devices. Compression stockings would be our method of mechanical prophylaxis, which has been shown to be similar to pneumatic compression in the systematic review, Trey RJ, et al. Caren Surg. Feb; 239(2): 162-171, 2004.     Results showed a significantly higher incidence of symptomatic venous thromboembolic events among patients in the warfarin group vs. the aspirin group (1.75% vs. 0.56%). Researchers also noted a bleeding event rate of 1.5% among patients who received warfarin compared with a rate of 0.4% among patients who received aspirin.    Patient denies history of seizures.     I explained to following and the patient expressed understanding:  The patient is currently aware of the COVID19 pandemic and that proceeding with their surgical procedure could potentially increase exposure to coronavirus in the community. The patient understands that there is the possibility of delayed or cancelled appts or PT visits in the future. They understand that infection with the coronavirus could complicate their surgery  recovery. They are aware of the current policies and procedures of [facility] and the government regarding the pandemic and they were given the option of delaying my surgery. The patient elects to proceed with surgery at this time.     The patient was instructed to practice strict social distancing, hand washing/hygiene, respiratory hygiene, and cough etiquette from now until 6 weeks following surgery to reduce the risk of kirt coronavirus.    As there were no other questions to be asked, he was given my business card along with Douglas Vale MD business card if he has any questions or concerns prior to surgery or in the postop period.

## 2023-11-09 NOTE — PROGRESS NOTES
CC: Left knee pain    47 y.o. Male with a history of atraumatic left knee pain.  Patient works as a . He reports that approximately 6 months ago he was working on a 4 rodarte and standing up and felt pain in his knee. He is unsure of the exact mechanism. But he denies any pain prior to that incident. He states that since then he has had intermittent pain and swelling in his knee. He reports locking, and catching that is painful. He had a steroid injection 3 months ago that provided minimal relief. He was sent for an MRI and then referred to orthopedics.     He states that the pain is severe and not responding to any conservative care.      He reports that the pain and weakness. It also bothers him at night.    Positive mechanical symptoms, Denies instability    Is affecting ADLs.  Pain is 6/10 at it's worst.    REVIEW OF SYSTEMS:  Constitution: Negative. Negative for chills, fever and night sweats.   HENT: Negative for congestion and headaches.    Eyes: Negative for blurred vision, left vision loss and right vision loss.   Cardiovascular: Negative for chest pain and syncope.   Respiratory: Negative for cough and shortness of breath.    Endocrine: Negative for polydipsia, polyphagia and polyuria.   Hematologic/Lymphatic: Negative for bleeding problem. Does not bruise/bleed easily.   Skin: Negative for dry skin, itching and rash.   Musculoskeletal: Negative for falls. Positive for left knee pain and  muscle weakness.   Gastrointestinal: Negative for abdominal pain and bowel incontinence.   Genitourinary: Negative for bladder incontinence and nocturia.   Neurological: Negative for disturbances in coordination, loss of balance and seizures.   Psychiatric/Behavioral: Negative for depression. The patient does not have insomnia.    Allergic/Immunologic: Negative for hives and persistent infections.     PAST MEDICAL HISTORY:    No past medical history on file.    PAST SURGICAL HISTORY:   Past  "Surgical History:   Procedure Laterality Date    HERNIA REPAIR         FAMILY HISTORY:   No family history on file.    SOCIAL HISTORY:   Social History     Socioeconomic History    Marital status:    Tobacco Use    Smoking status: Never    Smokeless tobacco: Never   Substance and Sexual Activity    Alcohol use: Yes     Comment: social    Drug use: No       MEDICATIONS:     Current Outpatient Medications:     aspirin 325 MG tablet, Take 1 tablet (325 mg total) by mouth once daily. for 21 days, Disp: 21 tablet, Rfl: 0    HYDROcodone-acetaminophen (NORCO) 7.5-325 mg per tablet, Take 1 tablet by mouth every 6 (six) hours as needed for Pain., Disp: 20 tablet, Rfl: 0    promethazine (PHENERGAN) 25 MG tablet, Take 1 tablet (25 mg total) by mouth every 6 (six) hours as needed for Nausea., Disp: 20 tablet, Rfl: 0    traMADoL (ULTRAM) 50 mg tablet, Take 1 tablet (50 mg total) by mouth every 6 (six) hours as needed for Pain., Disp: 20 tablet, Rfl: 0    ALLERGIES:   Review of patient's allergies indicates:  No Known Allergies    VITAL SIGNS:   BP (!) 155/107   Pulse 66   Ht 6' 4" (1.93 m)   Wt 106 kg (233 lb 11 oz)   BMI 28.45 kg/m²      PHYSICAL EXAMINATION  General:  The patient is alert and oriented x 3.  Mood is pleasant.  Observation of ears, eyes and nose reveal no gross abnormalities.  No labored breathing observed.    LEFT KNEE EXAMINATION     OBSERVATION / INSPECTION   Gait:   Nonantalgic   Alignment:  Neutral   Scars:   None   Muscle atrophy: Mild  Effusion:  None   Warmth:  None   Discoloration:   none     TENDERNESS / CREPITUS (T / C):          T / C      T / C   Patella   - / -   Lateral joint line   - / -   Peripatellar medial  -  Medial joint line    + / -   Peripatellar lateral -  Medial plica   - / -   Patellar tendon -   Popliteal fossa  - / -   Quad tendon   -   Gastrocnemius   -   Prepatellar Bursa - / -   Quadricep   -   Tibial tubercle  -  Thigh/hamstring  -   Pes " anserine/HS -  Fibula    -   ITB   - / -  Tibia     -   Tib/fib joint  - / -  LCL    -     MFC   - / -   MCL: Proximal  -    LFC   - / -    Distal   -          ROM: (* = pain)  PASSIVE   ACTIVE    Left :   5 / 0 / 145   5 / 0 / 145     Right :    5 / 0 / 145   5 / 0 / 145    Patellofemoral examination:  See above noted areas of tenderness.   Patella position    Subluxation / dislocation: Centered           Sup. / Inf;   Normal   Crepitus (PF):    Absent   Patellar Mobility:       Medial-lateral:   Normal    Superior-inferior:  Normal    Inferior tilt   Normal    Patellar tendon:  Normal   Lateral tilt:    Normal   J-sign:     None   Patellofemoral grind:   No pain       MENISCAL SIGNS:     Pain on terminal extension:  +  Pain on terminal flexion:  +  Maxines maneuver:  + (for medial)  Squat     - ()    LIGAMENT EXAMINATION:  ACL / Lachman:  normal (-1 to 2mm)    PCL-Post.  drawer: normal 0 to 2mm  MCL- Valgus:  normal 0 to 2mm  LCL- Varus:  normal 0 to 2mm  Pivot shift: normal (Equal)   Dial Test: difference c/w other side   At 30° flexion: normal (< 5°)    At 90° flexion: normal (< 5°)   Reverse Pivot Shift:   normal (Equal)     STRENGTH: (* = with pain) PAINFUL SIDE   Quadricep   5/5   Hamstrin/5    EXTREMITY NEURO-VASCULAR EXAMINATION:   Sensation:  Grossly intact to light touch all dermatomal regions.   Motor Function:  Fully intact motor function at hip, knee, foot and ankle    DTRs;  quadriceps and  achilles 2+.  No clonus and downgoing Babinski.    Vascular status:  DP and PT pulses 2+, brisk capillary refill, symmetric.     OTHER FINDINGS:      IMAGING:     X-rays including standing, weight bearing AP and flexion bilateral knees, lateral and merchant views ordered and images reviewed by me show:    No fracture, dislocation or other pathology   Medial compartment: no degenerative changes   Lateral compartment: no degenerative changes   Patellofemoral compartment: no degenerative changes    MRI:    FINDINGS:  Menisci:  The lateral meniscus appears normal.  There is truncation of the inner edge of the body of the medial meniscus and associated intrasubstance signal change.     Ligaments:  ACL, PCL, MCL, and LCL complex are intact.     Tendons:  Mild signal change at the insertion of the quadriceps tendon suggestive of mild tendinosis.  The patellar tendon is intact.     Cartilage:     Patellofemoral: Articular cartilage is maintained.     Medial tibiofemoral: There is a small is high-grade cartilage fissure at the weight-bearing portion of the medial femoral condyle with a tiny area of subchondral edema.  The remainder of the medial knee compartment cartilage is intact.     Lateral tibiofemoral: Articular cartilage is maintained.     Bone: No fracture or marrow replacing process.     Miscellaneous: There is no joint effusion.  There is subcutaneous soft tissue edema in the prepatellar region.  No popliteal cyst.  The remainder of the visualized subcutaneous soft tissue and musculature appear normal.     Impression:     Truncation of the inner edge of the body of the medial meniscus with intrasubstance signal change of the remainder of the body of the medial meniscus.       ASSESSMENT:    Left Knee  Pain, Medial Meniscus Tear, Medal Femoral Condyle cartilage injury   1. Complex tear of medial meniscus of left knee as current injury, initial encounter         PLAN:   1. Plan for Left Knee arthroscopy with partial menisectomy and chondroplasty  2. No clearance needed    All questions were answered, pt will contact us for questions or concerns in the interim.

## 2023-11-09 NOTE — H&P (VIEW-ONLY)
Douglas Escobar  is here for a completion of his perioperative paperwork. he  Is scheduled to undergo:    left   1. Arthroscopic partial meniscectomy versus possible repair  2. Possible arthroscopic synovectomy  3. Possible arthroscopic loose body removal  4. Possible arthroscopic chondroplasty     on 11/13/2023.      He is a healthy individual and does not need clearance for this procedure.     PAST MEDICAL HISTORY: History reviewed. No pertinent past medical history.  PAST SURGICAL HISTORY:   Past Surgical History:   Procedure Laterality Date    HERNIA REPAIR       FAMILY HISTORY: History reviewed. No pertinent family history.  SOCIAL HISTORY:   Social History     Socioeconomic History    Marital status:    Tobacco Use    Smoking status: Never    Smokeless tobacco: Never   Substance and Sexual Activity    Alcohol use: Yes     Comment: social    Drug use: No       MEDICATIONS: No current outpatient medications on file.  ALLERGIES: Review of patient's allergies indicates:  No Known Allergies    VITAL SIGNS: There were no vitals taken for this visit.     Risks, indications and benefits of the surgical procedure were discussed with the patient. All questions with regard to surgery, rehab, expected return to functional activities, activities of daily living and recreational endeavors were answered to his satisfaction.    It was explained to the patient that there may be an increase in surgical risks if the patient has certain co-morbidities such as but not limited to: Obesity, Cardiovascular issues (CHF, CAD, Arrhythmias), chronic pulmonary issues, previous or current neurovascular/neurological issues, previous strokes, diabetes mellitus, previous wound healing issues, previous wound or skin infections, PVD, clotting disorders, if the patient uses chronic steroids, if the patient takes or has immune compromising medications or diseases, or has previously or currently used tobacco products.     The patient  verbalized that he/she does not have any additional clotting, bleeding, or blood disorders, other than what is list in her chart on today's review.     Then a brief history and physical exam were performed.    Review of Systems   Constitution: Negative. Negative for chills, fever and night sweats.   HENT: Negative for congestion and headaches.    Eyes: Negative for blurred vision, left vision loss and right vision loss.   Cardiovascular: Negative for chest pain and syncope.   Respiratory: Negative for cough and shortness of breath.    Endocrine: Negative for polydipsia, polyphagia and polyuria.   Hematologic/Lymphatic: Negative for bleeding problem. Does not bruise/bleed easily.   Skin: Negative for dry skin, itching and rash.   Musculoskeletal: Negative for falls and muscle weakness.   Gastrointestinal: Negative for abdominal pain and bowel incontinence.   Genitourinary: Negative for bladder incontinence and nocturia.   Neurological: Negative for disturbances in coordination, loss of balance and seizures.   Psychiatric/Behavioral: Negative for depression. The patient does not have insomnia.    Allergic/Immunologic: Negative for hives and persistent infections.     PHYSICAL EXAM:  GEN: A&Ox3, WD WN NAD  HEENT: WNL  CHEST: CTAB, no W/R/R  HEART: RRR, no M/R/G  ABD: Soft, NT ND, BS x4 QUADS  MS; See Epic  NEURO: CN II-XII intact       The surgical consent was then reviewed with the patient, who agreed with all the contents of the consent form and it was signed. he was then given the [facility] surgery packet to bring with him to surgery for the anesthesia portion of his perioperative paperwork.   For all physicians except for Dr. Vale, we will email and possibly fax the consent forms and booking sheets to [facility] pre-admit.    The patient was given the opportunity to ask questions about the surgical plan and consent form, and once no other questions were asked, I proceeded with the pre-op  appointment.    PHYSICAL THERAPY:  He was also instructed regarding physical therapy and will begin on POD#1 at the [facility] location.     POST OP CARE:instructions were reviewed including care of the wound and dressing after surgery and when he can shower.     CRUTCHES OR WALKER: It was explained to the patient that if they are having a lower extremity surgery that they will require either a walker or crutches to ambulate safely with after surgery. It was explained that a cane or other assistive devices are not sufficient to safely ambulate with after surgery. I explained to the patient that I will place an order for them to receive either crutches or a walker after surgery to go home with. It was explained that if they have crutches or a walker at home already, that they are REQUIRED to bring them to the hospital on the day of surgery. It was explained that if they do not have them at the hospital on the day of surgery that they WILL be provided a new pair or crutches or a walker to go home with to ensure ambulation will be safe if the patient needs to stop somewhere on the way home.      PAIN MANAGEMENT: Douglas Escobar was also given their pain management regimen, which includes the TENS unit given to him by [facility] along with the education required for its use.     PAIN MEDICATION:  Norco 7.5/325mg 1 po q 4-6 hours prn pain  Ultram 50 mg Take 1-2 p.o. q.6 hours p.r.n. breakthrough pain,   Phenergan 25 mg one p.o. q.6 hours p.r.n. nausea and vomiting.    Post op meds to be delivered bedside prior to discharge. Deliver to family if patient is in surgery at 5pm.    The patient was told that narcotic pain medications may make them drowsy and instructions were given to not sign legal documents, drive or operate heavy machinery, cars, or equipment while under the influence of narcotic medications. The patient was told and understands that narcotic pain medications should only be used as needed to control pain and  that other options of pain control include TENs unit and ice packs/unit.     Patient was instructed to purchase and take Colace to counter possible GI side effects of taking opiates.     DVT prophylaxis was discussed with the patient today including risk factors for developing DVTs and history of DVTs. The patient was asked if any specific recommendations were given from the doctor/s that did pre-operative surgical clearance. The patient was then given an education sheet about DVTs and PE with warning signs and symptoms of both and steps to take if they suspect either of these.    Patient was asked if they were taking or using OCP pills or devices. If they answered yes, then they were instructed to stop using OCPs at this pre-operative appointment until 2 months post-op to help prevent DVT development. They understand that there are other forms of birth control that do not involve hormones. They expressed understanding that ignoring/not following this instruction could result in a DVT which could turn into a deadly pulmonary embolism.     This along with the Modified Caprini risk assessment model for VTE in general surgical patients was used to determine the patient's DVT risk.     From: Horacio MK, Arvin DA, Estefani SM, et al. Prevention of VTE in nonorthopedic surgical patients: antithrombotic therapy and prevention of thrombosis, 9th ed: American College of Chest Physicians evidence-based clinical practical guidelines. Chest 2012; 141:e227S. Copyright © 2012. Reproduced with permission from the American College of Chest Physicians.    The below listed DVT prophylaxis regimen was discussed along with SCDs during surgery and bilateral ISREAL compression stockings to be used post-op. Length of treatment has been determined to be 10-42 days post-op by the above noted Caprini assessment model. Early ambulation post-op was also discussed and emphasized with the patient.     Patient was instructed to buy and take:  Aspirin  325mg QD x 3 weeks for DVT prophylaxis starting on the evening after surgery.  Patient will also use bilateral TEDs on lower extremities, SCDs during surgery, and early ambulation post-op. If the patient was previously taking 81mg baby aspirin, they were told to not take it will using the above stated aspirin and to restart the 81mg aspirin after completion of the aspirin dose.      Patient was also told to buy over the counter Prilosec medication and take it once daily for GI protection as long as they are taking NSAIDs or Aspirin.     Published data in Ganesh LEE, et al. J Arthroplasty. Oct; 31(10):2237-40, 2016; showed that aspirin use as prophylaxis during revision total joint arthroplasty was more effective than warfarin in preventing symptomatic venous thromboembolic events and was associated with lower complications.  Patients in the study were also treated with intermittent pneumatic compression devices. Compression stockings would be our method of mechanical prophylaxis, which has been shown to be similar to pneumatic compression in the systematic review, Trey RJ, et al. Caren Surg. Feb; 239(2): 162-171, 2004.     Results showed a significantly higher incidence of symptomatic venous thromboembolic events among patients in the warfarin group vs. the aspirin group (1.75% vs. 0.56%). Researchers also noted a bleeding event rate of 1.5% among patients who received warfarin compared with a rate of 0.4% among patients who received aspirin.    Patient denies history of seizures.     I explained to following and the patient expressed understanding:  The patient is currently aware of the COVID19 pandemic and that proceeding with their surgical procedure could potentially increase exposure to coronavirus in the community. The patient understands that there is the possibility of delayed or cancelled appts or PT visits in the future. They understand that infection with the coronavirus could complicate their surgery  recovery. They are aware of the current policies and procedures of [facility] and the government regarding the pandemic and they were given the option of delaying my surgery. The patient elects to proceed with surgery at this time.     The patient was instructed to practice strict social distancing, hand washing/hygiene, respiratory hygiene, and cough etiquette from now until 6 weeks following surgery to reduce the risk of kirt coronavirus.    As there were no other questions to be asked, he was given my business card along with Douglas Vale MD business card if he has any questions or concerns prior to surgery or in the postop period.

## 2023-11-10 ENCOUNTER — ANESTHESIA EVENT (OUTPATIENT)
Dept: SURGERY | Facility: HOSPITAL | Age: 48
End: 2023-11-10
Payer: COMMERCIAL

## 2023-11-13 ENCOUNTER — ANESTHESIA (OUTPATIENT)
Dept: SURGERY | Facility: HOSPITAL | Age: 48
End: 2023-11-13
Payer: COMMERCIAL

## 2023-11-13 ENCOUNTER — HOSPITAL ENCOUNTER (OUTPATIENT)
Facility: HOSPITAL | Age: 48
Discharge: HOME | End: 2023-11-13
Attending: ORTHOPAEDIC SURGERY | Admitting: ORTHOPAEDIC SURGERY
Payer: COMMERCIAL

## 2023-11-13 VITALS
DIASTOLIC BLOOD PRESSURE: 89 MMHG | RESPIRATION RATE: 22 BRPM | TEMPERATURE: 98 F | BODY MASS INDEX: 28.37 KG/M2 | OXYGEN SATURATION: 99 % | SYSTOLIC BLOOD PRESSURE: 141 MMHG | HEART RATE: 82 BPM | WEIGHT: 233 LBS | HEIGHT: 76 IN

## 2023-11-13 DIAGNOSIS — S83.232A COMPLEX TEAR OF MEDIAL MENISCUS OF LEFT KNEE AS CURRENT INJURY, INITIAL ENCOUNTER: Primary | ICD-10-CM

## 2023-11-13 DIAGNOSIS — R52 PAIN: ICD-10-CM

## 2023-11-13 PROBLEM — M23.204 OLD TEAR OF MEDIAL MENISCUS OF LEFT KNEE: Status: ACTIVE | Noted: 2023-11-13

## 2023-11-13 PROCEDURE — 29881 ARTHRS KNE SRG MNISECTMY M/L: CPT | Mod: LT,,, | Performed by: ORTHOPAEDIC SURGERY

## 2023-11-13 PROCEDURE — 36000711: Performed by: ORTHOPAEDIC SURGERY

## 2023-11-13 PROCEDURE — D9220A PRA ANESTHESIA: ICD-10-PCS | Mod: ANES,,, | Performed by: ANESTHESIOLOGY

## 2023-11-13 PROCEDURE — 27200651 HC AIRWAY, LMA: Performed by: ANESTHESIOLOGY

## 2023-11-13 PROCEDURE — 25000242 PHARM REV CODE 250 ALT 637 W/ HCPCS: Performed by: NURSE ANESTHETIST, CERTIFIED REGISTERED

## 2023-11-13 PROCEDURE — 37000009 HC ANESTHESIA EA ADD 15 MINS: Performed by: ORTHOPAEDIC SURGERY

## 2023-11-13 PROCEDURE — 71000039 HC RECOVERY, EACH ADD'L HOUR: Performed by: ORTHOPAEDIC SURGERY

## 2023-11-13 PROCEDURE — 25000003 PHARM REV CODE 250: Performed by: NURSE ANESTHETIST, CERTIFIED REGISTERED

## 2023-11-13 PROCEDURE — 36000710: Performed by: ORTHOPAEDIC SURGERY

## 2023-11-13 PROCEDURE — 63600175 PHARM REV CODE 636 W HCPCS: Performed by: PHYSICIAN ASSISTANT

## 2023-11-13 PROCEDURE — 63600175 PHARM REV CODE 636 W HCPCS: Performed by: NURSE ANESTHETIST, CERTIFIED REGISTERED

## 2023-11-13 PROCEDURE — 29881 PR KNEE SCOPE SINGLE MENISECECTOMY: ICD-10-PCS | Mod: LT,,, | Performed by: ORTHOPAEDIC SURGERY

## 2023-11-13 PROCEDURE — 63600175 PHARM REV CODE 636 W HCPCS: Performed by: ANESTHESIOLOGY

## 2023-11-13 PROCEDURE — 27201423 OPTIME MED/SURG SUP & DEVICES STERILE SUPPLY: Performed by: ORTHOPAEDIC SURGERY

## 2023-11-13 PROCEDURE — 64447 PERIPHERAL BLOCK: ICD-10-PCS | Mod: 59,LT,, | Performed by: ANESTHESIOLOGY

## 2023-11-13 PROCEDURE — 94761 N-INVAS EAR/PLS OXIMETRY MLT: CPT

## 2023-11-13 PROCEDURE — 64447 NJX AA&/STRD FEMORAL NRV IMG: CPT | Performed by: ANESTHESIOLOGY

## 2023-11-13 PROCEDURE — 25000003 PHARM REV CODE 250: Performed by: ANESTHESIOLOGY

## 2023-11-13 PROCEDURE — 37000008 HC ANESTHESIA 1ST 15 MINUTES: Performed by: ORTHOPAEDIC SURGERY

## 2023-11-13 PROCEDURE — 71000015 HC POSTOP RECOV 1ST HR: Performed by: ORTHOPAEDIC SURGERY

## 2023-11-13 PROCEDURE — D9220A PRA ANESTHESIA: Mod: CRNA,,, | Performed by: NURSE ANESTHETIST, CERTIFIED REGISTERED

## 2023-11-13 PROCEDURE — D9220A PRA ANESTHESIA: ICD-10-PCS | Mod: CRNA,,, | Performed by: NURSE ANESTHETIST, CERTIFIED REGISTERED

## 2023-11-13 PROCEDURE — 63600175 PHARM REV CODE 636 W HCPCS: Performed by: ORTHOPAEDIC SURGERY

## 2023-11-13 PROCEDURE — D9220A PRA ANESTHESIA: Mod: ANES,,, | Performed by: ANESTHESIOLOGY

## 2023-11-13 PROCEDURE — 71000033 HC RECOVERY, INTIAL HOUR: Performed by: ORTHOPAEDIC SURGERY

## 2023-11-13 PROCEDURE — 25000003 PHARM REV CODE 250: Performed by: PHYSICIAN ASSISTANT

## 2023-11-13 PROCEDURE — 99900035 HC TECH TIME PER 15 MIN (STAT)

## 2023-11-13 RX ORDER — DEXAMETHASONE SODIUM PHOSPHATE 4 MG/ML
INJECTION, SOLUTION INTRA-ARTICULAR; INTRALESIONAL; INTRAMUSCULAR; INTRAVENOUS; SOFT TISSUE
Status: DISCONTINUED | OUTPATIENT
Start: 2023-11-13 | End: 2023-11-13

## 2023-11-13 RX ORDER — PROPOFOL 10 MG/ML
VIAL (ML) INTRAVENOUS
Status: DISCONTINUED | OUTPATIENT
Start: 2023-11-13 | End: 2023-11-13

## 2023-11-13 RX ORDER — SODIUM CHLORIDE 0.9 % (FLUSH) 0.9 %
3 SYRINGE (ML) INJECTION
Status: DISCONTINUED | OUTPATIENT
Start: 2023-11-13 | End: 2023-11-13 | Stop reason: HOSPADM

## 2023-11-13 RX ORDER — LIDOCAINE HYDROCHLORIDE 20 MG/ML
INJECTION INTRAVENOUS
Status: DISCONTINUED | OUTPATIENT
Start: 2023-11-13 | End: 2023-11-13

## 2023-11-13 RX ORDER — FAMOTIDINE 10 MG/ML
INJECTION INTRAVENOUS
Status: DISCONTINUED | OUTPATIENT
Start: 2023-11-13 | End: 2023-11-13

## 2023-11-13 RX ORDER — ONDANSETRON 2 MG/ML
INJECTION INTRAMUSCULAR; INTRAVENOUS
Status: DISCONTINUED | OUTPATIENT
Start: 2023-11-13 | End: 2023-11-13

## 2023-11-13 RX ORDER — SUCCINYLCHOLINE CHLORIDE 20 MG/ML
INJECTION INTRAMUSCULAR; INTRAVENOUS
Status: DISCONTINUED | OUTPATIENT
Start: 2023-11-13 | End: 2023-11-13

## 2023-11-13 RX ORDER — PROCHLORPERAZINE EDISYLATE 5 MG/ML
5 INJECTION INTRAMUSCULAR; INTRAVENOUS EVERY 30 MIN PRN
Status: DISCONTINUED | OUTPATIENT
Start: 2023-11-13 | End: 2023-11-13 | Stop reason: HOSPADM

## 2023-11-13 RX ORDER — CELECOXIB 200 MG/1
400 CAPSULE ORAL
Status: COMPLETED | OUTPATIENT
Start: 2023-11-13 | End: 2023-11-13

## 2023-11-13 RX ORDER — MIDAZOLAM HYDROCHLORIDE 1 MG/ML
0.5 INJECTION INTRAMUSCULAR; INTRAVENOUS
Status: DISCONTINUED | OUTPATIENT
Start: 2023-11-13 | End: 2023-11-13 | Stop reason: HOSPADM

## 2023-11-13 RX ORDER — ROPIVACAINE HYDROCHLORIDE 5 MG/ML
INJECTION, SOLUTION EPIDURAL; INFILTRATION; PERINEURAL
Status: COMPLETED | OUTPATIENT
Start: 2023-11-13 | End: 2023-11-13

## 2023-11-13 RX ORDER — EPINEPHRINE 1 MG/ML
INJECTION, SOLUTION, CONCENTRATE INTRAVENOUS
Status: DISCONTINUED | OUTPATIENT
Start: 2023-11-13 | End: 2023-11-13 | Stop reason: HOSPADM

## 2023-11-13 RX ORDER — NAPROXEN SODIUM 220 MG
220 TABLET ORAL
COMMUNITY

## 2023-11-13 RX ORDER — SODIUM CHLORIDE 9 MG/ML
INJECTION, SOLUTION INTRAVENOUS CONTINUOUS
Status: DISCONTINUED | OUTPATIENT
Start: 2023-11-13 | End: 2023-11-13 | Stop reason: HOSPADM

## 2023-11-13 RX ORDER — FENTANYL CITRATE 50 UG/ML
25 INJECTION, SOLUTION INTRAMUSCULAR; INTRAVENOUS EVERY 5 MIN PRN
Status: DISCONTINUED | OUTPATIENT
Start: 2023-11-13 | End: 2023-11-13 | Stop reason: HOSPADM

## 2023-11-13 RX ORDER — ACETAMINOPHEN 500 MG
1000 TABLET ORAL
Status: COMPLETED | OUTPATIENT
Start: 2023-11-13 | End: 2023-11-13

## 2023-11-13 RX ORDER — FENTANYL CITRATE 50 UG/ML
25-200 INJECTION, SOLUTION INTRAMUSCULAR; INTRAVENOUS
Status: DISCONTINUED | OUTPATIENT
Start: 2023-11-13 | End: 2023-11-13 | Stop reason: HOSPADM

## 2023-11-13 RX ORDER — ALBUTEROL SULFATE 90 UG/1
AEROSOL, METERED RESPIRATORY (INHALATION)
Status: DISCONTINUED | OUTPATIENT
Start: 2023-11-13 | End: 2023-11-13

## 2023-11-13 RX ORDER — KETAMINE HCL IN 0.9 % NACL 50 MG/5 ML
SYRINGE (ML) INTRAVENOUS
Status: DISCONTINUED | OUTPATIENT
Start: 2023-11-13 | End: 2023-11-13

## 2023-11-13 RX ADMIN — CEFAZOLIN 2 G: 2 INJECTION, POWDER, FOR SOLUTION INTRAMUSCULAR; INTRAVENOUS at 10:11

## 2023-11-13 RX ADMIN — FENTANYL CITRATE 25 MCG: 50 INJECTION INTRAMUSCULAR; INTRAVENOUS at 11:11

## 2023-11-13 RX ADMIN — PROPOFOL 200 MG: 10 INJECTION, EMULSION INTRAVENOUS at 10:11

## 2023-11-13 RX ADMIN — DEXAMETHASONE SODIUM PHOSPHATE 8 MG: 4 INJECTION, SOLUTION INTRAMUSCULAR; INTRAVENOUS at 10:11

## 2023-11-13 RX ADMIN — ALBUTEROL SULFATE 6 PUFF: 108 AEROSOL, METERED RESPIRATORY (INHALATION) at 11:11

## 2023-11-13 RX ADMIN — CELECOXIB 400 MG: 200 CAPSULE ORAL at 08:11

## 2023-11-13 RX ADMIN — ALBUTEROL SULFATE 6 PUFF: 108 AEROSOL, METERED RESPIRATORY (INHALATION) at 10:11

## 2023-11-13 RX ADMIN — ONDANSETRON 4 MG: 2 INJECTION INTRAMUSCULAR; INTRAVENOUS at 10:11

## 2023-11-13 RX ADMIN — ROPIVACAINE HYDROCHLORIDE 20 ML: 5 INJECTION EPIDURAL; INFILTRATION; PERINEURAL at 09:11

## 2023-11-13 RX ADMIN — SODIUM CHLORIDE: 0.9 INJECTION, SOLUTION INTRAVENOUS at 10:11

## 2023-11-13 RX ADMIN — SUCCINYLCHOLINE CHLORIDE 160 MG: 20 INJECTION, SOLUTION INTRAMUSCULAR; INTRAVENOUS at 10:11

## 2023-11-13 RX ADMIN — FENTANYL CITRATE 100 MCG: 50 INJECTION INTRAMUSCULAR; INTRAVENOUS at 09:11

## 2023-11-13 RX ADMIN — MIDAZOLAM HYDROCHLORIDE 2 MG: 1 INJECTION, SOLUTION INTRAMUSCULAR; INTRAVENOUS at 10:11

## 2023-11-13 RX ADMIN — SODIUM CHLORIDE: 0.9 INJECTION, SOLUTION INTRAVENOUS at 08:11

## 2023-11-13 RX ADMIN — Medication 20 MG: at 10:11

## 2023-11-13 RX ADMIN — MIDAZOLAM HYDROCHLORIDE 2 MG: 1 INJECTION, SOLUTION INTRAMUSCULAR; INTRAVENOUS at 09:11

## 2023-11-13 RX ADMIN — FAMOTIDINE 20 MG: 10 INJECTION, SOLUTION INTRAVENOUS at 10:11

## 2023-11-13 RX ADMIN — ACETAMINOPHEN 1000 MG: 500 TABLET ORAL at 08:11

## 2023-11-13 RX ADMIN — PROPOFOL 100 MG: 10 INJECTION, EMULSION INTRAVENOUS at 10:11

## 2023-11-13 RX ADMIN — LIDOCAINE HYDROCHLORIDE 75 MG: 20 INJECTION INTRAVENOUS at 10:11

## 2023-11-13 NOTE — ANESTHESIA PROCEDURE NOTES
Intubation    Date/Time: 11/13/2023 10:09 AM    Performed by: Coreen Castrejon CRNA  Authorized by: Alda Wagner MD    Intubation:     Induction:  Intravenous    Intubated:  Postinduction    Mask Ventilation:  Easy mask    Attempts:  1    Attempted By:  CRNA    Difficult Airway Encountered?: No      Complications:  None    Airway Device:  Supraglottic airway/LMA    Airway Device Size:  4.0    Style/Cuff Inflation:  Cuffed    Placement Verified By:  Capnometry    Complicating Factors:  None    Findings Post-Intubation:  BS equal bilateral and atraumatic/condition of teeth unchanged

## 2023-11-13 NOTE — ANESTHESIA POSTPROCEDURE EVALUATION
Anesthesia Post Evaluation    Patient: Douglas Escobar    Procedure(s) Performed: Procedure(s) (LRB):  ARTHROSCOPY, KNEE, WITH PARTIAL MEDIAL MENISCECTOMY (Left)  ARTHROSCOPY, KNEE, WITH CHONDROPLASTY (Left)    Final Anesthesia Type: general      Patient location during evaluation: PACU  Patient participation: Yes- Able to Participate  Level of consciousness: awake and alert  Post-procedure vital signs: reviewed and stable  Pain management: adequate  Airway patency: patent    PONV status at discharge: No PONV  Anesthetic complications: no      Cardiovascular status: blood pressure returned to baseline  Respiratory status: unassisted  Hydration status: euvolemic  Follow-up not needed.          Vitals Value Taken Time   /89 11/13/23 1229   Temp 36.6 °C (97.9 °F) 11/13/23 1125   Pulse 76 11/13/23 1230   Resp 27 11/13/23 1229   SpO2 97 % 11/13/23 1230   Vitals shown include unvalidated device data.      Event Time   Out of Recovery 12:32:00         Pain/Eloise Score: Pain Rating Prior to Med Admin: 6 (11/13/2023 11:47 AM)  Eloise Score: 8 (11/13/2023 11:25 AM)

## 2023-11-13 NOTE — BRIEF OP NOTE
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 No  Was the patient administered a VTE Prophylaxis? No. Short procedure; low risk  Estimated Blood Loss: minimal           Specimens (From admission, onward)      None          Implants: * No implants in log *    Complications: none           Disposition: PACU - hemodynamically stable.           Condition: Stable    Attestation:  I was present for the entire procedure.

## 2023-11-13 NOTE — TRANSFER OF CARE
"Anesthesia Transfer of Care Note    Patient: Douglas Escobar    Procedure(s) Performed: Procedure(s) (LRB):  ARTHROSCOPY, KNEE, WITH PARTIAL MEDIAL MENISCECTOMY (Left)  ARTHROSCOPY, KNEE, WITH CHONDROPLASTY (Left)    Patient location: PACU    Anesthesia Type: general    Transport from OR: Transported from OR on 6-10 L/min O2 by face mask with adequate spontaneous ventilation    Post pain: adequate analgesia    Post assessment: no apparent anesthetic complications and tolerated procedure well    Post vital signs: stable    Level of consciousness: awake and alert    Nausea/Vomiting: no nausea/vomiting    Complications: none    Transfer of care protocol was followed    Last vitals: Visit Vitals  /82 (BP Location: Right arm, Patient Position: Lying)   Pulse 69   Temp 36.7 °C (98 °F) (Oral)   Resp 11   Ht 6' 4" (1.93 m)   Wt 105.7 kg (233 lb)   SpO2 97%   BMI 28.36 kg/m²     "

## 2023-11-13 NOTE — DISCHARGE INSTRUCTIONS
[address], [place]                                                                                          [phone]                   Postoperative Instructions for Knee Surgery                 Your Surgery Included:   Open               Arthroscopic   [] Ligament Repair       [] Diagnostic           [] ACL     [] PCL     [] MCL     [] PLLC      [] Synovectomy / Plica Removal [] Meniscal Cartilage Repair / Transplantation      [] Lysis of Adhesions / Manipulation [] Articular Cartilage Repair      [] Interval Release           [] Microfracture       [] OATS   [] ACI      [x] Meniscectomy           [] Osteochondral Allograft      [] Meniscal Cartilage Repair  [] Patellar Realignment       [x] Debridement / Chondroplasty         [] Lateral Release   [] Ligament Repair      [] Articular Cartilage Repair          [] Extensor Mechanism             []   Microfracture  []  OATS         []  Cartilage Biopsy [] Tendon Repair          [] Ligament Reconstruction          [] Patella                  [] Quadriceps             []   ACL    []   PCL  [] High Tibial Osteotomy       [] PRP Arthrocentesis  [] Joint Replacement         [] Amniox Arthrocentesis           [] Unicompartmental   [] Patellofemoral                    [] Total Knee                  Call our office ([phone]) immediately if you experience any of the following:       Excessive bleeding or pus like drainage at the incision site       Uncontrollable pain not relieved by pain medication       Excessive swelling or redness at the incision site       Fever above 101.5 degrees not controlled with Tylenol or Motrin       Shortness of Breath       Any foul odor or blistering from the surgery site    FOR EMERGENCIES: If any unusual problems or difficulties occur, call our office at [phone], or page the  at [phone] who will direct your call appropriately    1.   Pain Management: A cold therapy cuff, pain  medications, local injections, and in some cases, regional anesthesia injections are used to manage your post-operative pain. The decision to use each of these options is based on their risks and benefits.     Medications: You were given one or more of the following medication prescriptions before leaving the hospital. Have the prescriptions filled at a pharmacy on your way home and follow the instructions on the bottles. If you need a refill, please call your pharmacy.      Narcotic Medication (usually Norco/Hydrocodone APAP, Percocet/Oxycodone APAP, Roxicodone, or Tramadol): Begin taking the medication before your knee starts to hurt. Some patients do not like to take any medication, but if you wait until your pain is severe before taking, you will be very uncomfortable for several hours waiting for the narcotic to work. Always take with food.     Nausea / Vomiting: For this issue, we prescribe Phenergan, use this medication as directed.     Cold Therapy: You may have been sent home with a [facility] cold therapy unit and wrap for your knee. Fill with ice and water, or frozen water bottles with water, to the indicated fill line and use throughout the day for the first two days and then as needed to help relieve pain and control swelling. Ice the knee in 30 minute intervals, and do not place the wrap directly onto the skin.     Regional Anesthesia Injections (Blocks): You may have been given a regional nerve block either before or after surgery. This may make your entire leg numb for 24-36 hours.                            * Proceed with caution when bearing weight on your leg.     2.   Diet: Eat a bland diet for the first day after surgery. Progress your diet as tolerated. Constipation may occur with Narcotic usage, contact our office if you are experiencing constipation.    3.   Activity: Limit your activity during the first 48 hours, keep your leg elevated with pillows under your heel. After the first 48 hours  at home, increase your activity level based on your symptoms.    4.   Dressing Change: Remove the soft dressing on the 3rd day. IF YOU HAVE A TAN AQUACEL BANDAGE ON YOUR KNEE, DO NOT REMOVE THIS. It is normal for some blood to be seen on the dressings. It is also normal for you to see apparent bruising on the skin around your knee when you remove the dressing. If present, leave the steri-strip tape across the incisions. If you are concerned by the drainage or the appearance of your knee, please call one of the numbers listed below.    5.   Showering: You may shower on the 3rd day after surgery with waterproof bandages over small incisions. If you have an incision with Prineo (clear mesh), it does not need to be covered. Do not submerge in any water until after your postoperative appointment in clinic.    6.   Your procedure did not require a post-operative brace.    7.   Your procedure did not require a Continuous Passive Motion (CPM) device.    8.   Weight Bearing: You may have been sent home with crutches. If instructed (see below), use these crutches at all times unless at complete rest.      [x] Full weight bearing as tolerated with crutches      [x]  NOW        9.  Knee Exercises: Begin these exercises the first day after surgery in order to help you regain your motion and strength. You may do the following marked exercises:     [x] Quad Sets - Begin activating your quadriceps muscle by driving your          knee downward into full knee extension while seated on a table or bed   with a towel rolled and propped under your heel     [x] Straight Leg Raise (SLR) - While kirt your quadriceps muscle, lift     your fully extended leg to the level of your non-operative knee (as shown)     [x] Heel Slides - With the knee straight, slide your heel slowly toward your   buttocks, hold at the endpoint for 10-15 seconds, then slowly straighten     [x] Ankle pumps - With your knee straight, move your ankle in a  ""pumping"    fashion to activate your calf and leg muscles      10.  Physical Therapy: Physical therapy is an essential component to your recovery from surgery. Your physical therapy will start in 1 days.    FIRST POSTOPERATIVE VISIT: As scheduled.       "

## 2023-11-13 NOTE — ANESTHESIA PREPROCEDURE EVALUATION
"                                                                                                             2023  Douglas Escobar is a 47 y.o., male.Pre-operative evaluation for Procedure(s) (LRB):  ARTHROSCOPY, KNEE, WITH MENISCECTOMY (Left)  ARTHROSCOPY, KNEE, WITH CHONDROPLASTY (Left)    Douglas Escobar is a 47 y.o. male     There is no problem list on file for this patient.      Review of patient's allergies indicates:  No Known Allergies    No current facility-administered medications on file prior to encounter.     Current Outpatient Medications on File Prior to Encounter   Medication Sig Dispense Refill    aspirin 325 MG tablet Take 1 tablet (325 mg total) by mouth once daily. for 21 days 21 tablet 0    HYDROcodone-acetaminophen (NORCO) 7.5-325 mg per tablet Take 1 tablet by mouth every 6 (six) hours as needed for Pain. 20 tablet 0    naproxen sodium (ANAPROX) 220 MG tablet Take 220 mg by mouth every 12 (twelve) hours.      promethazine (PHENERGAN) 25 MG tablet Take 1 tablet (25 mg total) by mouth every 6 (six) hours as needed for Nausea. 20 tablet 0    traMADoL (ULTRAM) 50 mg tablet Take 1 tablet (50 mg total) by mouth every 6 (six) hours as needed for Pain. 20 tablet 0       Past Surgical History:   Procedure Laterality Date    HERNIA REPAIR         Social History     Socioeconomic History    Marital status:    Tobacco Use    Smoking status: Never    Smokeless tobacco: Never   Substance and Sexual Activity    Alcohol use: Yes     Comment: social    Drug use: No         CBC: No results for input(s): "WBC", "RBC", "HGB", "HCT", "PLT", "MCV", "MCH", "MCHC" in the last 72 hours.    CMP: No results for input(s): "NA", "K", "CL", "CO2", "BUN", "CREATININE", "GLU", "MG", "PHOS", "CALCIUM", "ALBUMIN", "PROT", "ALKPHOS", "ALT", "AST", "BILITOT" in the last 72 hours.    INR  No results for input(s): "PT", "INR", "PROTIME", "APTT" in the last 72 hours.        Diagnostic Studies:      EKD " Echo:  No results found for this or any previous visit.        Pre-op Assessment    I have reviewed the Patient Summary Reports.    I have reviewed the NPO Status.      Review of Systems  Anesthesia Hx:  No problems with previous Anesthesia   History of prior surgery of interest to airway management or planning:  Previous anesthesia: General        Denies Family Hx of Anesthesia complications.    Denies Personal Hx of Anesthesia complications.                    Cardiovascular:  Exercise tolerance: good                                               Physical Exam  General: Well nourished, Cooperative, Alert and Oriented    Airway:  Mallampati: II   Mouth Opening: Normal  TM Distance: Normal  Tongue: Normal  Neck ROM: Normal ROM    Dental:  Intact        Anesthesia Plan  Type of Anesthesia, risks & benefits discussed:    Anesthesia Type: Gen Supraglottic Airway, Regional  Intra-op Monitoring Plan: Standard ASA Monitors  Post Op Pain Control Plan: multimodal analgesia  Induction:  IV  Airway Plan: Direct, Post-Induction  Informed Consent: Informed consent signed with the Patient and all parties understand the risks and agree with anesthesia plan.  All questions answered.   ASA Score: 1    Ready For Surgery From Anesthesia Perspective.     .

## 2023-11-13 NOTE — ANESTHESIA PROCEDURE NOTES
Intubation    Date/Time: 11/13/2023 10:19 AM    Performed by: Coreen Castrejon CRNA  Authorized by: Alda Wagner MD    Intubation:     Induction:  Intravenous    Intubated:  Postinduction    Mask Ventilation:  Not attempted    Attempts:  1    Attempted By:  CRNA    Method of Intubation:  Video laryngoscopy    Blade:  Veliz 3    Laryngeal View Grade: Grade I - full view of cords      Difficult Airway Encountered?: No      Complications:  None and reflux of gastric contents - no apparent aspiration    Airway Device:  Oral endotracheal tube    Airway Device Size:  7.5    Style/Cuff Inflation:  Cuffed    Inflation Amount (mL):  7    Tube secured:  22    Secured at:  The lips    Placement Verified By:  Capnometry    Complicating Factors:  None    Findings Post-Intubation:  BS equal bilateral and atraumatic/condition of teeth unchanged

## 2023-11-13 NOTE — ANESTHESIA PROCEDURE NOTES
Peripheral Block    Patient location during procedure: pre-op   Block not for primary anesthetic.  Reason for block: at surgeon's request and post-op pain management   Post-op Pain Location: left knee   Start time: 11/13/2023 9:21 AM  Timeout: 11/13/2023 9:20 AM   End time: 11/13/2023 9:25 AM    Staffing  Authorizing Provider: Alda Wagner MD  Performing Provider: Alda Wagner MD    Staffing  Performed by: Alda Wagner MD  Authorized by: Alda Wagner MD    Preanesthetic Checklist  Completed: patient identified, IV checked, site marked, risks and benefits discussed, surgical consent, monitors and equipment checked, pre-op evaluation and timeout performed  Peripheral Block  Patient position: supine  Prep: ChloraPrep  Patient monitoring: heart rate, cardiac monitor, continuous pulse ox, continuous capnometry and frequent blood pressure checks  Block type: adductor canal  Laterality: left  Injection technique: single shot  Needle  Needle type: Stimuplex   Needle gauge: 21 G  Needle length: 4 in  Needle localization: anatomical landmarks and ultrasound guidance   -ultrasound image captured on disc.  Assessment  Injection assessment: negative aspiration, negative parasthesia and local visualized surrounding nerve  Paresthesia pain: none  Heart rate change: no  Slow fractionated injection: yes  Pain Tolerance: comfortable throughout block and no complaints  Medications:    Medications: ropivacaine (NAROPIN) injection 0.5% - Perineural   20 mL - 11/13/2023 9:25:00 AM    Additional Notes  VSS.  [facility] RN monitoring vitals throughout procedure.  Patient tolerated procedure well.   Ropi 0.25% injected

## 2023-11-13 NOTE — PLAN OF CARE
Pre op complete. Pt resting comfortably. Call light in reach. Family at bedside. Belongings placed in locker. Pt needs crutches. WCTM.

## 2023-11-13 NOTE — PLAN OF CARE
Patient tolerated procedure well.  VSS, complaints of pain well-controlled with interventions, tolerating po.  Preparing for discharge.  AVS reviewed with patient and family at the bedside. Verbalized understanding of S/S of complications, site care, ice therapy, activity and bathing restrictions, pain control, follow up appts and general recovery. Confirmed delivery of meds.  IV  removed prior to discharge.

## 2023-11-14 NOTE — OP NOTE
[facility] [place])  Surgery Department  Operative Note    SUMMARY     Date of Procedure: 11/13/2023     Procedure: Procedure(s) (LRB):  ARTHROSCOPY, KNEE, WITH PARTIAL MEDIAL MENISCECTOMY (Left)  ARTHROSCOPY, KNEE, WITH CHONDROPLASTY (Left)     Surgeon(s) and Role:     * Douglas Vale MD - Primary    Assisting Surgeon:   DO Drew Bello PA-C    Pre-Operative Diagnosis: Old tear of medial meniscus of left knee, unspecified tear type [M23.204]  Chondromalacia of left knee [M94.262]    Post-Operative Diagnosis: Post-Op Diagnosis Codes:     * Old tear of medial meniscus of left knee, unspecified tear type [M23.204]     * Chondromalacia of left knee [M94.262]    Anesthesia: General    Technical Procedures Used:     DATE OF PROCEDURE:  11/13/2023      PREOPERATIVE DIAGNOSES:   1. Left knee chondromalacia  2. Left knee medial meniscus tear.      POSTOPERATIVE DIAGNOSES:   1. Left knee chondromalacia  2. Left knee medial meniscus tear.      PROCEDURES:   1. Left knee arthroscopic chondroplasty  2. Left knee arthroscopic partial medial meniscectomy     SURGEON: Douglas Vale M.D.      ASSISTANT:   DO Drew Bello PA-C     COMPLICATIONS: None.      POSITION: Supine with arthroscopic leg tirado.      ANESTHESIA: General endotracheal.      COMPLICATIONS: None.      TOURNIQUET TIME:  None     EBL:  Minimal     EXAMINATION UNDER ANESTHESIA:   Knee: full range of motion, no evidence of instability.      ARTHROSCOPIC FINDINGS:   1. Complex tear posterior horn / body medial meniscus.   2. Intact lateral meniscus  3. Chondromalacia, patella, trochlea, MFC grade 2/3 extensive     INDICATIONS: The patient is a 47 y.o. M with a history of left knee pain. MRI confirms a tear in his medial meniscus.  After the risks and benefits of surgery were discussed with the patient, including bleeding, infection, scarring, persistent pain, risk of blood clots (DVT), pulmonary embolism,  compartment syndrome, damage to cartilage, damage to neurovascular structures, plus the risks of anesthesia, including, death, stroke, and heart attack, the patient wished to proceed with operative intervention.        DESCRIPTION OF PROCEDURE:      The patient and correct limb were identified and marked in the pre-op holding area.      The patient was brought in the room. After undergoing general endotracheal anesthesia, he was placed on a well-padded operating table. his left lower extremity was prepped and draped in usual sterile fashion. A nonsterile tourniquet was placed high up around the thigh. A well padded arthroscopic leg tirado was used during the case. The contralateral leg was placed in a well padded Gerber stirrup with bony prominences all being well padded.       The standard inferolateral and inferomedial portals were created. Diagnostic arthroscopy performed.      CHONDROPLASTY: The patellofemoral joint was entered. There was significant chondromalacia on the trochlea and on the undersurface of the patella. Using a full radium shaver and biter, unstable cartilage flaps were trimmed down to a stable rim.     There was a mild synovitis noted within the anterior interval. An VAPR device was used to remove areas of irritating synovium from the overlying trochlea in the anterior interval to allow for visualization to minimize abrasion.     CHONDROPLASTY:  Attention turned to the medial compartment. Medial femoral condyle had grade 2/3 extensive changes along the mid flexion portion.  Using a full radius shaver and biter, unstable cartilage flaps were trimmed down to a stable rim.      There was a complex tear in the body and posterior horn of the medial meniscus, Using combination of straight and curved biters and arthroscopic cathleen, the unstable portion of the medial meniscus was trimmed down to a stable rim. Approximately 25% of the body of the medial meniscus was resected down to get this to a stable  position.      Attention was turned to the intercondylar notch. The ACL and PCL were intact.      Attention was turned to the lateral compartment. The lateral meniscus, the lateral femoral condyle and lateral tibial plateau were intact.       Portal sites were closed with 4-0 Monocryl, covered with Mastisol, Steri-Strips, Xeroform, 4 x 4 fluffs, ABD pads and thigh-high ISREAL hose.     The patient was extubated in the room, transferred to Recovery Room in stable condition accompanied by his physician.  There were no complications in the case.      I was present, scrubbed and did perform all critical portions of the case.        DORA BOSTON MD        Description of the Findings of the Procedure: above    Significant Surgical Tasks Conducted by the Assistant(s), if Applicable: none    Complications: No    Estimated Blood Loss (EBL): * No values recorded between 11/13/2023 10:28 AM and 11/13/2023 11:20 AM *           Implants: * No implants in log *    Specimens:   Specimen (24h ago, onward)      None                    Condition: Good    Disposition: PACU - hemodynamically stable.    Attestation: I was present and scrubbed for the entire procedure.    Discharge Note    SUMMARY     Admit Date: 11/13/2023    Discharge Date and Time: 11/13/2023 12:57 PM    Hospital Course (synopsis of major diagnoses, care, treatment, and services provided during the course of the hospital stay): outpatient surgery     Final Diagnosis: Post-Op Diagnosis Codes:     * Old tear of medial meniscus of left knee, unspecified tear type [M23.204]     * Chondromalacia of left knee [M94.262]    Disposition: Home or Self Care    Follow Up/Patient Instructions:     Medications:  Reconciled Home Medications:      Medication List        CONTINUE taking these medications      aspirin 325 MG tablet  Take 1 tablet (325 mg total) by mouth once daily. for 21 days     HYDROcodone-acetaminophen 7.5-325 mg per tablet  Commonly known as: NORCO  Take 1  tablet by mouth every 6 (six) hours as needed for Pain.     naproxen sodium 220 MG tablet  Commonly known as: ANAPROX  Take 220 mg by mouth every 12 (twelve) hours.     promethazine 25 MG tablet  Commonly known as: PHENERGAN  Take 1 tablet (25 mg total) by mouth every 6 (six) hours as needed for Nausea.     traMADoL 50 mg tablet  Commonly known as: ULTRAM  Take 1 tablet (50 mg total) by mouth every 6 (six) hours as needed for Pain.            Discharge Procedure Orders   Diet Adult Regular     Notify your health care provider if you experience any of the following:  temperature >100.4     Notify your health care provider if you experience any of the following:  persistent nausea and vomiting or diarrhea     Notify your health care provider if you experience any of the following:  severe uncontrolled pain     Notify your health care provider if you experience any of the following:  redness, tenderness, or signs of infection (pain, swelling, redness, odor or green/yellow discharge around incision site)     Notify your health care provider if you experience any of the following:  difficulty breathing or increased cough     Notify your health care provider if you experience any of the following:  severe persistent headache     Notify your health care provider if you experience any of the following:  worsening rash     Notify your health care provider if you experience any of the following:  persistent dizziness, light-headedness, or visual disturbances      Remove dressing in 72 hours

## 2023-11-16 ENCOUNTER — CLINICAL SUPPORT (OUTPATIENT)
Dept: REHABILITATION | Facility: HOSPITAL | Age: 48
End: 2023-11-16
Payer: COMMERCIAL

## 2023-11-16 DIAGNOSIS — R26.9 GAIT ABNORMALITY: ICD-10-CM

## 2023-11-16 DIAGNOSIS — M25.662 DECREASED RANGE OF MOTION OF LEFT KNEE: Primary | ICD-10-CM

## 2023-11-16 DIAGNOSIS — R26.89 BALANCE PROBLEM: ICD-10-CM

## 2023-11-16 DIAGNOSIS — S83.232A COMPLEX TEAR OF MEDIAL MENISCUS OF LEFT KNEE AS CURRENT INJURY, INITIAL ENCOUNTER: ICD-10-CM

## 2023-11-16 DIAGNOSIS — R29.898 DECREASED STRENGTH OF LOWER EXTREMITY: ICD-10-CM

## 2023-11-16 PROCEDURE — 97140 MANUAL THERAPY 1/> REGIONS: CPT

## 2023-11-16 PROCEDURE — 97161 PT EVAL LOW COMPLEX 20 MIN: CPT

## 2023-11-16 PROCEDURE — 97110 THERAPEUTIC EXERCISES: CPT

## 2023-11-16 NOTE — PLAN OF CARE
[facility]  Physical Therapy Initial Evaluation    Name: Douglas Escobar  [facility] Number: [id number]    Therapy Diagnosis:   Encounter Diagnosis   Name Primary?    Complex tear of medial meniscus of left knee as current injury, initial encounter      Physician: Drew Spence PA-C    Physician Orders: PT Teressa and Treat  Medical Diagnosis from Referral: S83.232A (ICD-10-CM) - Complex tear of medial meniscus of left knee as current injury, initial encounter  Evaluation Date: 11/16/2023  Authorization Period Expiration: 12/31/2023  Plan of Care Expiration: 01/25/2024  Progress Note Due: 12/31/2024  Visit # / Visits authorized: 1/ 1   FOTO Follow Up: #1 given at eval  FOTO Follow UP:     Time In: 0300 pm  Time Out: 0400 pm   Total Appointment Time (timed & untimed codes): 60 minutes    DOS: 11/13/2023  S/p: 1. Left knee arthroscopic chondroplasty  2. Left knee arthroscopic partial medial meniscectomy     Post-Op Precautions: s/p L medial meniscectomy    Precautions: Standard and post-op    Subjective     Date of onset: 11/13/2003  History of current condition - Douglas reports: a little over 6 months ago he was kneeling down working on a 4-rodarte and when he stood up his knee was very stiff and painful. He reports that over the following months he experienced repeated catching, locking, and buckling. He underwent the above procedure and has had no pain since. Reports he used his crutches for the first day and has been walking around since without them. Reports he only limps at the end of the day..     Imaging MRI: Truncation of the inner edge of the body of the medial meniscus with intrasubstance signal change of the remainder of the body of the medial meniscus.     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    Surgical History:   Douglas Escobar  has a past surgical history that includes Hernia repair; Knee arthroscopy w/ meniscectomy (Left, 11/13/2023); and Arthroscopic chondroplasty of knee joint (Left, 11/13/2023).    Medications:   Douglas has a current medication list which includes the following prescription(s): aspirin, hydrocodone-acetaminophen, naproxen sodium, promethazine, and tramadol.    Allergies:   Review of patient's allergies indicates:  No Known Allergies     Objective     Gait: pt ambulated without AD with notable antalgic gait and 20 deg flexed knee gait    Knee Active Range of Motion:   Right  Left    Flexion 130 120   Extension 0 0     Knee Passive Range of Motion:   Right  Left    Flexion 135 125   Extension 5 3       Ankle Active Range of Motion: WFL      Quad Set: moderate, partial patellar superior glide, glute and HS co-contraction       Joint Mobility: hypomobile patellar superiorly and medially      Palpation: TTP at incision sites      Sensation: intact throughout LE      Pulses: intact dorsal pedal pulse    Special Tests: Not performed today due to post-op status   Strength: Not performed today due to post-op status.        CMS Impairment/Limitation/Restriction for FOTO Knee Survey    Therapist reviewed FOTO scores for Douglas Escobar on 11/16/2023.   FOTO documents entered into [facility] - see Media section.    Limitation Score: 59%  Predicted Score: 16%  Category: Mobility       Treatment     Treatment Time In: 0335 pm  Treatment Time Out: 0400 pm   Total Treatment time separate from Evaluation: 25  "minutes     Douglas received the treatments listed below:      Therapeutic exercises to develop strength, endurance, ROM, and flexibility for 15 minutes including:  Heel prop; 5 min  Hinge stretch; 20x 5" hold  Quad set; 10 x 10"  Heel slide 5 min     Manual therapy techniques: Joint mobilizations and Soft tissue Mobilization were applied to the: L knee for 10 minutes, including:  Patellar mobs Gr 2-3  Fat pad mobs at 0 and 20 deg flex      Home Exercises and Patient Education Provided     Education provided:   -  Pt was educated and demonstrated good understanding of post-op precautions, timeframe for recovery, prognosis, objective findings and progress/goals, Tx rationale/options, HEP review/discussion on modification and or progression/regression, expectations for rehab, normal and expected soreness, activity modification/avoidance/pacing, use of ICE/elevation of extremity, activity pacing, anatomy/physiology of pathology, benefits of exercise and physical therapy, use/fit of sling, wound care, s/s infection and DVT.       - Prognosis, activity modification, goals for therapy, role of therapy for care, exercises/HEP    Written Home Exercises Provided: yes.  Exercises were reviewed and Douglas was able to demonstrate them prior to the end of the session.   Pt received a written copy of exercises to perform at home. Douglas demonstrated good  understanding of the education provided.     See EMR under patient instructions for exercises given.     Assessment     Douglas is a 47 y.o. male referred to outpatient Physical Therapy s/p L medial meniscectomy. He demonstrated deficits in range of motion, strength, balance, gait, joint mobility, and functional movements such as squatting and stair navigation. His signs and symptoms are consistent with 3 days s/p the above surgery. Pt will benefit from skilled outpatient Physical Therapy to address the deficits stated above and in the chart below, provide pt/family education, and to " maximize pt's level of independence. Pt prognosis is Good.     Plan of care discussed with patient: No  Pt's spiritual, cultural and educational needs considered and patient is agreeable to the plan of care and goals as stated below:       Anticipated Barriers for therapy: none    Medical Necessity is demonstrated by the following  History  Co-morbidities and personal factors that may impact the plan of care [x] LOW: no personal factors / co-morbidities  [] MODERATE: 1-2 personal factors / co-morbidities  [] HIGH: 3+ personal factors / co-morbidities    Moderate / High Support Documentation:   Co-morbidities affecting plan of care: none    Personal Factors:   no deficits     Examination  Body Structures and Functions, activity limitations and participation restrictions that may impact the plan of care [] LOW: addressing 1-2 elements  [] MODERATE: 3+ elements  [x] HIGH: 4+ elements (please support below)    Moderate / High Support Documentation: range of motion, strength, gait, balance, motor control     Clinical Presentation [x] LOW: stable  [] MODERATE: Evolving  [] HIGH: Unstable     Decision Making/ Complexity Score: low       Goals:    Goals:  Short Term Goals: 6-8 weeks  1. Pt will be compliant with HEP 50% of prescribed amount.   2. The pt to demo improvement in L knee ROM to equal R knee ROM pain free   3.  The pt to demo good quad set with proper hyperextension moment of the L knee   4. The pt to demo ambualting with elast restricitve AD witout major compensatory pattern R knee for at least 20 feet      Long Term Goals: 8-10 weeks   Pt will be compliant with % of prescribed amount.   Patient will improve their FOTO limitation score to 16% or better as evidence of clinically significant improvements in their function.  The pt to demo tolerance to a squat at or bellow parallel with uncompensated mechanics x10   The pt to demo strength of L LE within 10% of R LE as demo'd on the isometric testing   The  pt will report full participation in ADLs and IADLs without restrictions related to L knee.       Plan   Plan of care Certification: 11/16/2023 to 01/25/2024.    Outpatient Physical Therapy 2 times weekly for 10 weeks to include the following interventions: Electrical Stimulation NMES, Gait Training, Manual Therapy, Moist Heat/ Ice, Neuromuscular Re-ed, Patient Education, Therapeutic Activities, and Therapeutic Exercise.     Rhoda Mancera, PT , DPT

## 2023-11-17 RX ORDER — MIDAZOLAM HYDROCHLORIDE 1 MG/ML
INJECTION, SOLUTION INTRAMUSCULAR; INTRAVENOUS
Status: DISCONTINUED | OUTPATIENT
Start: 2023-11-13 | End: 2023-11-17

## 2023-11-17 NOTE — ADDENDUM NOTE
Addendum  created 11/17/23 0947 by Coreen Castrejon CRNA    Intraprocedure Meds edited, Orders acknowledged in Narrator

## 2023-11-21 ENCOUNTER — CLINICAL SUPPORT (OUTPATIENT)
Dept: REHABILITATION | Facility: HOSPITAL | Age: 48
End: 2023-11-21
Payer: COMMERCIAL

## 2023-11-21 DIAGNOSIS — R29.898 DECREASED STRENGTH OF LOWER EXTREMITY: Primary | ICD-10-CM

## 2023-11-21 PROCEDURE — 97110 THERAPEUTIC EXERCISES: CPT

## 2023-11-21 PROCEDURE — 97112 NEUROMUSCULAR REEDUCATION: CPT

## 2023-11-21 PROCEDURE — 97140 MANUAL THERAPY 1/> REGIONS: CPT

## 2023-11-21 NOTE — PROGRESS NOTES
"[facility]   Physical Therapy Treatment Note     Name: Douglas Escobar  [facility] Number: [id number]    Therapy Diagnosis:   Encounter Diagnosis   Name Primary?    Decreased strength of lower extremity Yes     Physician: Drew Spence PA-C    Visit Date: 11/21/2023  Physician Orders: PT Eval and Treat  Medical Diagnosis from Referral: S83.232A (ICD-10-CM) - Complex tear of medial meniscus of left knee as current injury, initial encounter  Evaluation Date: 11/16/2023  Authorization Period Expiration: 12/31/2023  Plan of Care Expiration: 01/25/2024  Progress Note Due: 12/31/2024  Visit # / Visits authorized: 1/ 12 (+eval)  FOTO Follow Up: #1 given at eval  FOTO Follow UP:      Time In: 1100 pm  Time Out: 1200 pm   Total Billable Time: 60 minutes     DOS: 11/13/2023  S/p: 1. Left knee arthroscopic chondroplasty  2. Left knee arthroscopic partial medial meniscectomy     Post-Op Precautions: s/p L medial meniscectomy     Precautions: Standard and post-op    SUBJECTIVE     Pt reports: his knee is less swollen but a little sore.  He was compliant with home exercise program.  Response to previous treatment: improved crutch use  Functional change: improved gait    Pain: 2/10  Location: left knee      OBJECTIVE     Objective Measures updated at progress report unless specified.     8 days s/p as of 11/21    Treatment       Douglas received the treatments listed below:      Therapeutic exercises to develop strength, endurance, ROM, and flexibility for 15 minutes including:  Hinge stretch; 20x 5" hold  Heel slide 5 min   Upright bike; 8 min for tissue extensibility      Manual therapy techniques: Joint mobilizations and Soft tissue Mobilization were applied to the: L knee for 10 minutes, including:  Patellar mobs Gr 2-3  Fat pad mobs at 0 and 20 deg flex     NEUROMUSCULAR RE-EDUCATION ACTIVITIES to improve Balance, Coordination, Kinesthetic, Sense, Proprioception, and Posture for 35 minutes.  The " "following were included:   NMES 10" on/20" off x 15 min plus set up   Saq over 1/2 foam  Quad set in heel prop  SLR at EOB 3 x 10    THERAPEUTIC ACTIVITIES to improve dynamic and functional performance for 00 minutes including.    GAIT TRAINING to improve functional mobility and safety for 00 minutes.           Patient Education and Home Exercises     Home Exercises Provided and Patient Education Provided     Education provided:   - continue HEP, emphasize SLR at EOB    Written Home Exercises Provided: Patient instructed to cont prior HEP. Exercises were reviewed and Douglas was able to demonstrate them prior to the end of the session.  Douglas demonstrated good  understanding of the education provided. See EMR under Patient Instructions for exercises provided during therapy sessions    ASSESSMENT     Douglas presented with good extension range of motion and moderate quad set. This improved throughout session following NMES and he was heavily educated on the importance of quad control in gait to avoid continued swelling. He will benefit continued quad NMES for improved control and gait.     Douglas Is progressing well towards his goals.   Pt prognosis is Good.     Pt will continue to benefit from skilled outpatient physical therapy to address the deficits listed in the problem list box on initial evaluation, provide pt/family education and to maximize pt's level of independence in the home and community environment.     Pt's spiritual, cultural and educational needs considered and pt agreeable to plan of care and goals.     Anticipated barriers to physical therapy: none    Goals:   Short Term Goals: 6-8 weeks  1. Pt will be compliant with HEP 50% of prescribed amount. (Progressing, not met)   2. The pt to demo improvement in L knee ROM to equal R knee ROM pain free  (Progressing, not met)   3.  The pt to demo good quad set with proper hyperextension moment of the L knee  (Progressing, not met)   4. The pt to demo ambualting " with elast restricitve AD witout major compensatory pattern R knee for at least 20 feet  (Progressing, not met)      Long Term Goals: 8-10 weeks   Pt will be compliant with % of prescribed amount.  (Progressing, not met)   Patient will improve their FOTO limitation score to 16% or better as evidence of clinically significant improvements in their function. (Progressing, not met)   The pt to demo tolerance to a squat at or bellow parallel with uncompensated mechanics x10  (Progressing, not met)   The pt to demo strength of L LE within 10% of R LE as demo'd on the isometric testing  (Progressing, not met)   The pt will report full participation in ADLs and IADLs without restrictions related to L knee.  (Progressing, not met)     PLAN     Continue to strengthen quad and normalize gait    Rhoda Mancera, PT, DPT

## 2023-11-28 ENCOUNTER — CLINICAL SUPPORT (OUTPATIENT)
Dept: REHABILITATION | Facility: HOSPITAL | Age: 48
End: 2023-11-28
Payer: COMMERCIAL

## 2023-11-28 ENCOUNTER — OFFICE VISIT (OUTPATIENT)
Dept: SPORTS MEDICINE | Facility: CLINIC | Age: 48
End: 2023-11-28
Payer: COMMERCIAL

## 2023-11-28 VITALS
WEIGHT: 233.69 LBS | HEART RATE: 74 BPM | BODY MASS INDEX: 28.46 KG/M2 | DIASTOLIC BLOOD PRESSURE: 94 MMHG | SYSTOLIC BLOOD PRESSURE: 143 MMHG | HEIGHT: 76 IN

## 2023-11-28 DIAGNOSIS — Z98.890 S/P ARTHROSCOPIC SURGERY OF LEFT KNEE: ICD-10-CM

## 2023-11-28 DIAGNOSIS — R29.898 DECREASED STRENGTH OF LOWER EXTREMITY: Primary | ICD-10-CM

## 2023-11-28 DIAGNOSIS — Z09 SURGERY FOLLOW-UP EXAMINATION: Primary | ICD-10-CM

## 2023-11-28 PROCEDURE — 97112 NEUROMUSCULAR REEDUCATION: CPT

## 2023-11-28 PROCEDURE — 1159F PR MEDICATION LIST DOCUMENTED IN MEDICAL RECORD: ICD-10-PCS | Mod: CPTII,S$GLB,, | Performed by: PHYSICIAN ASSISTANT

## 2023-11-28 PROCEDURE — 99024 POSTOP FOLLOW-UP VISIT: CPT | Mod: S$GLB,,, | Performed by: PHYSICIAN ASSISTANT

## 2023-11-28 PROCEDURE — 3080F DIAST BP >= 90 MM HG: CPT | Mod: CPTII,S$GLB,, | Performed by: PHYSICIAN ASSISTANT

## 2023-11-28 PROCEDURE — 97140 MANUAL THERAPY 1/> REGIONS: CPT

## 2023-11-28 PROCEDURE — 1160F PR REVIEW ALL MEDS BY PRESCRIBER/CLIN PHARMACIST DOCUMENTED: ICD-10-PCS | Mod: CPTII,S$GLB,, | Performed by: PHYSICIAN ASSISTANT

## 2023-11-28 PROCEDURE — 3077F PR MOST RECENT SYSTOLIC BLOOD PRESSURE >= 140 MM HG: ICD-10-PCS | Mod: CPTII,S$GLB,, | Performed by: PHYSICIAN ASSISTANT

## 2023-11-28 PROCEDURE — 1159F MED LIST DOCD IN RCRD: CPT | Mod: CPTII,S$GLB,, | Performed by: PHYSICIAN ASSISTANT

## 2023-11-28 PROCEDURE — 1160F RVW MEDS BY RX/DR IN RCRD: CPT | Mod: CPTII,S$GLB,, | Performed by: PHYSICIAN ASSISTANT

## 2023-11-28 PROCEDURE — 97110 THERAPEUTIC EXERCISES: CPT

## 2023-11-28 PROCEDURE — 99024 PR POST-OP FOLLOW-UP VISIT: ICD-10-PCS | Mod: S$GLB,,, | Performed by: PHYSICIAN ASSISTANT

## 2023-11-28 PROCEDURE — 3077F SYST BP >= 140 MM HG: CPT | Mod: CPTII,S$GLB,, | Performed by: PHYSICIAN ASSISTANT

## 2023-11-28 PROCEDURE — 3080F PR MOST RECENT DIASTOLIC BLOOD PRESSURE >= 90 MM HG: ICD-10-PCS | Mod: CPTII,S$GLB,, | Performed by: PHYSICIAN ASSISTANT

## 2023-11-28 PROCEDURE — 99999 PR PBB SHADOW E&M-EST. PATIENT-LVL III: ICD-10-PCS | Mod: PBBFAC,,, | Performed by: PHYSICIAN ASSISTANT

## 2023-11-28 PROCEDURE — 99999 PR PBB SHADOW E&M-EST. PATIENT-LVL III: CPT | Mod: PBBFAC,,, | Performed by: PHYSICIAN ASSISTANT

## 2023-11-28 NOTE — PROGRESS NOTES
"CC: Left knee scope post op 2 weeks    Patient is here for his 2 week post op appointment s/p below and is doing well. Patient is doing PT at [facility] and is progressing as expected. Patient is no longer taking pain medication. he denies any chest pain, SOB, fevers, chills, nausea, vomiting, or drainage from incision sites.     DATE OF PROCEDURE:  11/13/2023      PREOPERATIVE DIAGNOSES:   1. Left knee chondromalacia  2. Left knee medial meniscus tear.      POSTOPERATIVE DIAGNOSES:   1. Left knee chondromalacia  2. Left knee medial meniscus tear.      PROCEDURES:   1. Left knee arthroscopic chondroplasty  2. Left knee arthroscopic partial medial meniscectomy     SURGEON: Douglas Vale M.D.     PE:    BP (!) 143/94   Pulse 74   Ht 6' 4" (1.93 m)   Wt 106 kg (233 lb 11 oz)   BMI 28.45 kg/m²      Left knee:    Incisions clean/dry/intact  No sign of infection  Mild swelling  Compartments soft  Neurovascular status intact in extremity    AROM 0 to 130 degrees  Decreased quad strength  1+ effusion    Assessment:  2 weeks s/p left knee arthroscopic chondroplasty and partial medial meniscectomy    Plan:  1.  Removed steri strips.  Wounds healing well    2.  Arthroscopic pictures reviewed and rehab plans discussed.    3.  Physical therapy for quad, ROM, modalities.  HEP for ROM, knee, VMO and hip abductor strengthening.     4.  Pain level acceptable for first post op visit.  Wean off pain medicine by next visit if still taking    5. Return to clinic in 4 weeks at 6 weeks post-op.      All questions were answered. Instructed patient to call with questions or concerns in the interim.       "

## 2023-11-28 NOTE — PROGRESS NOTES
"[facility]   Physical Therapy Treatment Note     Name: Douglas Escobar  [facility] Number: [id number]    Therapy Diagnosis:   Encounter Diagnosis   Name Primary?    Decreased strength of lower extremity Yes     Physician: Drew Spence PA-C    Visit Date: 11/28/2023  Physician Orders: PT Eval and Treat  Medical Diagnosis from Referral: S83.232A (ICD-10-CM) - Complex tear of medial meniscus of left knee as current injury, initial encounter  Evaluation Date: 11/16/2023  Authorization Period Expiration: 12/31/2023  Plan of Care Expiration: 01/25/2024  Progress Note Due: 12/31/2024  Visit # / Visits authorized: 2/12 (+eval)  FOTO Follow Up: #1 given at eval  FOTO Follow UP:      Time In: 1403  Time Out: 1459  Total Billable Time: 56 minutes     DOS: 11/13/2023  S/p: 1. Left knee arthroscopic chondroplasty  2. Left knee arthroscopic partial medial meniscectomy     Post-Op Precautions: s/p L medial meniscectomy     Precautions: Standard and post-op    SUBJECTIVE     Pt reports: his knee is doing well. He is having minimal pain and swelling has gone down by 50%.  He was compliant with home exercise program.  Response to previous treatment: improved crutch use  Functional change: improved gait    Pain: 1/10  Location: left knee      OBJECTIVE     Objective Measures updated at progress report unless specified.     15 days s/p as of 11/28    Treatment       Douglas received the treatments listed below:      Therapeutic exercises to develop strength, endurance, ROM, and flexibility for 14 minutes including:  Upright bike: 8 min for tissue extensibility   Heel prop: 4 mins    NP:  Hinge stretch: 20x5" hold  Heel slide 5 min      Manual therapy techniques: Joint mobilizations and Soft tissue Mobilization were applied to the: L knee for 11 minutes, including:  Patellar mobs Gr 2-3  Fat pad mobs at 0 and 20 deg flex     NEUROMUSCULAR RE-EDUCATION ACTIVITIES to improve Balance, Coordination, Kinesthetic, " "Sense, Proprioception, and Posture for 31 minutes.  The following were included:   NMES 10" on/20" off x 16 min plus set up  Quad set in heel prop 8 min  Saq over full foam 8 min  SLR at EOB 3x10 with 3" hold  Standing TKE: GTB, 3x10  Sidelying hip abduction: 3x10    THERAPEUTIC ACTIVITIES to improve dynamic and functional performance for 00 minutes including.    GAIT TRAINING to improve functional mobility and safety for 00 minutes.     Patient Education and Home Exercises     Home Exercises Provided and Patient Education Provided     Education provided:   - continue HEP, emphasize SLR at EOB    Written Home Exercises Provided: Patient instructed to cont prior HEP. Exercises were reviewed and Douglas was able to demonstrate them prior to the end of the session.  Douglas demonstrated good  understanding of the education provided. See EMR under Patient Instructions for exercises provided during therapy sessions    ASSESSMENT     Douglas presented with improved flexion range of motion and decreased swelling. Able to achieve 130 degrees of flexion; equal to contralateral limb. He continues to progress with quad activation via NMES. Patient inquired about crutch usage, which he was educated that he may wean off them with home ambulation but should continue with usage in community. Will continue to progress per protocol.    Douglas Is progressing well towards his goals.   Pt prognosis is Good.     Pt will continue to benefit from skilled outpatient physical therapy to address the deficits listed in the problem list box on initial evaluation, provide pt/family education and to maximize pt's level of independence in the home and community environment.     Pt's spiritual, cultural and educational needs considered and pt agreeable to plan of care and goals.     Anticipated barriers to physical therapy: none    Goals:   Short Term Goals: 6-8 weeks  1. Pt will be compliant with HEP 50% of prescribed amount. (Progressing, not met)   2. " The pt to demo improvement in L knee ROM to equal R knee ROM pain free  (Progressing, not met)   3.  The pt to demo good quad set with proper hyperextension moment of the L knee  (Progressing, not met)   4. The pt to demo ambualting with elast restricitve AD witout major compensatory pattern R knee for at least 20 feet  (Progressing, not met)      Long Term Goals: 8-10 weeks   Pt will be compliant with % of prescribed amount.  (Progressing, not met)   Patient will improve their FOTO limitation score to 16% or better as evidence of clinically significant improvements in their function. (Progressing, not met)   The pt to demo tolerance to a squat at or bellow parallel with uncompensated mechanics x10  (Progressing, not met)   The pt to demo strength of L LE within 10% of R LE as demo'd on the isometric testing  (Progressing, not met)   The pt will report full participation in ADLs and IADLs without restrictions related to L knee.  (Progressing, not met)     PLAN     Continue to strengthen quad and normalize gait    Shyam Mae, PT, DPT

## 2023-11-30 ENCOUNTER — CLINICAL SUPPORT (OUTPATIENT)
Dept: REHABILITATION | Facility: HOSPITAL | Age: 48
End: 2023-11-30
Payer: COMMERCIAL

## 2023-11-30 DIAGNOSIS — R29.898 DECREASED STRENGTH OF LOWER EXTREMITY: Primary | ICD-10-CM

## 2023-11-30 PROCEDURE — 97110 THERAPEUTIC EXERCISES: CPT

## 2023-11-30 PROCEDURE — 97530 THERAPEUTIC ACTIVITIES: CPT

## 2023-11-30 PROCEDURE — 97112 NEUROMUSCULAR REEDUCATION: CPT

## 2023-11-30 NOTE — PROGRESS NOTES
"[facility]   Physical Therapy Treatment Note     Name: Douglas Escobar  [facility] Number: [id number]    Therapy Diagnosis:   Encounter Diagnosis   Name Primary?    Decreased strength of lower extremity Yes       Physician: Drew Spence PA-C    Visit Date: 11/30/2023  Physician Orders: PT Eval and Treat  Medical Diagnosis from Referral: S83.232A (ICD-10-CM) - Complex tear of medial meniscus of left knee as current injury, initial encounter  Evaluation Date: 11/16/2023  Authorization Period Expiration: 12/31/2023  Plan of Care Expiration: 01/25/2024  Progress Note Due: 12/31/2024  Visit # / Visits authorized: 3/12 (+eval)  FOTO Follow Up: #1 given at [facility]  FOTO Follow UP:      Time In: 0200 pm  Time Out: 0300 pm  Total Billable Time: 60 minutes     DOS: 11/13/2023  S/p: 1. Left knee arthroscopic chondroplasty  2. Left knee arthroscopic partial medial meniscectomy     Post-Op Precautions: s/p L medial meniscectomy     Precautions: Standard and post-op    SUBJECTIVE     Pt reports: he is feeling better and is able to do more  He was compliant with home exercise program.  Response to previous treatment: improved crutch use  Functional change: improved gait    Pain: 1/10  Location: left knee      OBJECTIVE     Objective Measures updated at progress report unless specified.     2 weeks and 3  days s/p as of 11/30    Range of Motion:   Knee Right  Left    Active 0-0-120 0-0-120   Passive 5-0-130 5-0-130     Treatment       Douglas received the treatments listed below:      Therapeutic exercises to develop strength, endurance, ROM, and flexibility for 15 minutes including:  Upright bike: 8 min for tissue extensibility   Hinge stretch: 20 x 5" hold  Heel slide to quad set 4 min   DL heel raises; 20 x 5" hold  DL bridges; 3 x 10     NP Today:   Heel prop: 4 mins     Manual therapy techniques: Joint mobilizations and Soft tissue Mobilization were applied to the: L knee for 05 minutes, " "including:  Patellar mobs Gr 2-3  Fat pad mobs at 0 and 20 deg flex     NEUROMUSCULAR RE-EDUCATION ACTIVITIES to improve Balance, Coordination, Kinesthetic, Sense, Proprioception, and Posture for 25 minutes.  The following were included:   Quad set in heel prop 10 x 10"   SAQ over 1/2 foam; 10 x 10"   SLR at EOB 10x with 3" hold  SLR in long sitting; 2 x 10   Sidelying hip abduction: 3x10- NP    THERAPEUTIC ACTIVITIES to improve dynamic and functional performance for 15 minutes including.  Standing TKE w/ weight shift; RTB; 20 x 5"  Step up; 4" 3 x 10    GAIT TRAINING to improve functional mobility and safety for 00 minutes.     Patient Education and Home Exercises     Home Exercises Provided and Patient Education Provided     Education provided:   - continue HEP, emphasize SLR at EOB    Written Home Exercises Provided: Patient instructed to cont prior HEP. Exercises were reviewed and Douglas was able to demonstrate them prior to the end of the session.  Douglas demonstrated good  understanding of the education provided. See EMR under Patient Instructions for exercises provided during therapy sessions    ASSESSMENT     Douglas is progressing well in quad control and was significantly fatigued by step ups and TKEs. He will benefit continued functional strengthening with emphasis on normalizing gait.     Douglas Is progressing well towards his goals.   Pt prognosis is Good.     Pt will continue to benefit from skilled outpatient physical therapy to address the deficits listed in the problem list box on initial evaluation, provide pt/family education and to maximize pt's level of independence in the home and community environment.     Pt's spiritual, cultural and educational needs considered and pt agreeable to plan of care and goals.     Anticipated barriers to physical therapy: none    Goals:   Short Term Goals: 6-8 weeks  1. Pt will be compliant with HEP 50% of prescribed amount. (Progressing, not met)   2. The pt to demo " improvement in L knee ROM to equal R knee ROM pain free  (Progressing, not met)   3.  The pt to demo good quad set with proper hyperextension moment of the L knee  (Progressing, not met)   4. The pt to demo ambualting with elast restricitve AD witout major compensatory pattern R knee for at least 20 feet  (Progressing, not met)      Long Term Goals: 8-10 weeks   Pt will be compliant with % of prescribed amount.  (Progressing, not met)   Patient will improve their FOTO limitation score to 16% or better as evidence of clinically significant improvements in their function. (Progressing, not met)   The pt to demo tolerance to a squat at or bellow parallel with uncompensated mechanics x10  (Progressing, not met)   The pt to demo strength of L LE within 10% of R LE as demo'd on the isometric testing  (Progressing, not met)   The pt will report full participation in ADLs and IADLs without restrictions related to L knee.  (Progressing, not met)     PLAN     Continue to strengthen quad and normalize gait    Rhoda Mancera, PT, DPT

## 2023-12-05 ENCOUNTER — CLINICAL SUPPORT (OUTPATIENT)
Dept: REHABILITATION | Facility: HOSPITAL | Age: 48
End: 2023-12-05
Payer: COMMERCIAL

## 2023-12-05 DIAGNOSIS — R29.898 DECREASED STRENGTH OF LOWER EXTREMITY: Primary | ICD-10-CM

## 2023-12-05 PROCEDURE — 97140 MANUAL THERAPY 1/> REGIONS: CPT

## 2023-12-05 PROCEDURE — 97110 THERAPEUTIC EXERCISES: CPT

## 2023-12-05 PROCEDURE — 97530 THERAPEUTIC ACTIVITIES: CPT

## 2023-12-05 PROCEDURE — 97112 NEUROMUSCULAR REEDUCATION: CPT

## 2023-12-05 NOTE — PROGRESS NOTES
"[facility]   Physical Therapy Treatment Note     Name: Douglas Escobar  [facility] Number: [id number]    Therapy Diagnosis:   Encounter Diagnosis   Name Primary?    Decreased strength of lower extremity Yes       Physician: Drew Spence PA-C    Visit Date: 12/5/2023  Physician Orders: PT Eval and Treat  Medical Diagnosis from Referral: S83.232A (ICD-10-CM) - Complex tear of medial meniscus of left knee as current injury, initial encounter  Evaluation Date: 11/16/2023  Authorization Period Expiration: 12/31/2023  Plan of Care Expiration: 01/25/2024  Progress Note Due: 12/31/2024  Visit # / Visits authorized: 4/12 (+eval)  FOTO Follow Up: #1 given at eval  FOTO Follow UP:      Time In: 0200 pm  Time Out: 0300 pm  Total Billable Time: 60 minutes     DOS: 11/13/2023  S/p: 1. Left knee arthroscopic chondroplasty  2. Left knee arthroscopic partial medial meniscectomy     Post-Op Precautions: s/p L medial meniscectomy     Precautions: Standard and post-op    SUBJECTIVE     Pt reports: he was on his feet a lot the yesterday with [employer] shopping and climbing ladders at work and he reports significant swelling  He was compliant with home exercise program.  Response to previous treatment: improved crutch use  Functional change: improved gait    Pain: 1/10  Location: left knee      OBJECTIVE     Objective Measures updated at progress report unless specified.     3 weeks and 1 days s/p as of 12/5    Range of Motion:   Knee Right  Left    Active 0-0-120 0-0-120   Passive 5-0-130 5-0-130     Treatment       Douglas received the treatments listed below:      Therapeutic exercises to develop strength, endurance, ROM, and flexibility for 20 minutes including:  Upright bike: 8 min for tissue extensibility   DL bridges; 3 x 10 ; GTB    NP Today:   Heel prop: 4 mins  Hinge stretch: 20 x 5" hold  Heel slide to quad set 4 min   DL heel raises; 20 x 5" hold     Manual therapy techniques: Joint mobilizations " "and Soft tissue Mobilization were applied to the: L knee for 10 minutes, including:  Patellar mobs Gr 2-3  Fat pad mobs at 0 and 20 deg flex     NEUROMUSCULAR RE-EDUCATION ACTIVITIES to improve Balance, Coordination, Kinesthetic, Sense, Proprioception, and Posture for 15 minutes.  The following were included:   Quad set in heel prop 10 x 10"   SLR in long sitting; 30x    THERAPEUTIC ACTIVITIES to improve dynamic and functional performance for 15 minutes including.  Standing TKE w/ step; RTB; 20 x 5"  Step up; 6" 3 x 10    GAIT TRAINING to improve functional mobility and safety for 00 minutes.     Patient Education and Home Exercises     Home Exercises Provided and Patient Education Provided     Education provided:   - continue HEP, emphasize SLR at EOB    Written Home Exercises Provided: Patient instructed to cont prior HEP. Exercises were reviewed and Douglas was able to demonstrate them prior to the end of the session.  Douglas demonstrated good  understanding of the education provided. See EMR under Patient Instructions for exercises provided during therapy sessions    ASSESSMENT     Douglas presented with notable pre-patellar swelling that improved slightly within session. Gave compression sleeve and instructions for edema management. He demonstrated good quad control in SLR but struggled to translate this to gait. He improved by end of session and was instructed to d/c crutch unless in strenuous environment. He will benefit continued quad and HS strengthening to normalize knee mechanics as well as functional strengthening for return to work.     Douglas Is progressing well towards his goals.   Pt prognosis is Good.     Pt will continue to benefit from skilled outpatient physical therapy to address the deficits listed in the problem list box on initial evaluation, provide pt/family education and to maximize pt's level of independence in the home and community environment.     Pt's spiritual, cultural and educational " needs considered and pt agreeable to plan of care and goals.     Anticipated barriers to physical therapy: none    Goals:   Short Term Goals: 6-8 weeks  1. Pt will be compliant with HEP 50% of prescribed amount. (Progressing, not met)   2. The pt to demo improvement in L knee ROM to equal R knee ROM pain free  (Progressing, not met)   3.  The pt to demo good quad set with proper hyperextension moment of the L knee  (Progressing, not met)   4. The pt to demo ambualting with elast restricitve AD witout major compensatory pattern R knee for at least 20 feet  (Progressing, not met)      Long Term Goals: 8-10 weeks   Pt will be compliant with % of prescribed amount.  (Progressing, not met)   Patient will improve their FOTO limitation score to 16% or better as evidence of clinically significant improvements in their function. (Progressing, not met)   The pt to demo tolerance to a squat at or bellow parallel with uncompensated mechanics x10  (Progressing, not met)   The pt to demo strength of L LE within 10% of R LE as demo'd on the isometric testing  (Progressing, not met)   The pt will report full participation in ADLs and IADLs without restrictions related to L knee.  (Progressing, not met)     PLAN     Continue to strengthen quad and normalize gait    Rhoda Mancera, PT, DPT

## 2023-12-07 ENCOUNTER — CLINICAL SUPPORT (OUTPATIENT)
Dept: REHABILITATION | Facility: HOSPITAL | Age: 48
End: 2023-12-07
Payer: COMMERCIAL

## 2023-12-07 DIAGNOSIS — R29.898 DECREASED STRENGTH OF LOWER EXTREMITY: Primary | ICD-10-CM

## 2023-12-07 PROCEDURE — 97112 NEUROMUSCULAR REEDUCATION: CPT

## 2023-12-07 PROCEDURE — 97110 THERAPEUTIC EXERCISES: CPT

## 2023-12-07 PROCEDURE — 97140 MANUAL THERAPY 1/> REGIONS: CPT

## 2023-12-07 NOTE — PROGRESS NOTES
"[facility]   Physical Therapy Treatment Note     Name: Douglas Escobar  [facility] Number: [id number]    Therapy Diagnosis:   Encounter Diagnosis   Name Primary?    Decreased strength of lower extremity Yes       Physician: Drew Spence PA-C    Visit Date: 12/7/2023  Physician Orders: PT Eval and Treat  Medical Diagnosis from Referral: S83.232A (ICD-10-CM) - Complex tear of medial meniscus of left knee as current injury, initial encounter  Evaluation Date: 11/16/2023  Authorization Period Expiration: 12/31/2023  Plan of Care Expiration: 01/25/2024  Progress Note Due: 12/31/2024  Visit # / Visits authorized: 5/12 (+eval)  FOTO Follow Up: #1 given at [facility]  FOTO Follow UP:      Time In: 0200 pm  Time Out: 0300 pm  Total Billable Time: 60 minutes     DOS: 11/13/2023  S/p: 1. Left knee arthroscopic chondroplasty  2. Left knee arthroscopic partial medial meniscectomy     Post-Op Precautions: s/p L medial meniscectomy     Precautions: Standard and post-op    SUBJECTIVE     Pt reports: his swelling has gone down a lot but is still there, was very sore following last session   He was compliant with home exercise program.  Response to previous treatment: improved crutch use  Functional change: improved gait    Pain: 1/10  Location: left knee      OBJECTIVE     Objective Measures updated at progress report unless specified.     3 weeks and 3 days s/p as of 12/7    Range of Motion:   Knee Right  Left    Active 0-0-120 0-0-120   Passive 5-0-130 5-0-130     Treatment       Douglas received the treatments listed below:      Therapeutic exercises to develop strength, endurance, ROM, and flexibility for 25 minutes including:  Upright bike: 8 min for tissue extensibility   DL bridges; 3 x 10 ; GTB  Hammer strength knee ext; 5#; 3 x 15  Matrix SL HS curl; 25#; 3 x 15   Prone quad stretch; 3 x 20"  Natacha HS stretch; 3 x 20"  Calf stretch; 3 x 20"    NP Today:   Heel prop: 4 mins  Hinge stretch: 20 x 5" " "hold  Heel slide to quad set 4 min   DL heel raises; 20 x 5" hold     Manual therapy techniques: Joint mobilizations and Soft tissue Mobilization were applied to the: L knee for 10 minutes, including:  Patellar mobs Gr 2-3  Fat pad mobs at 0 and 20 deg flex     NEUROMUSCULAR RE-EDUCATION ACTIVITIES to improve Balance, Coordination, Kinesthetic, Sense, Proprioception, and Posture for 15 minutes.  The following were included:   Quad set in heel prop 10 x 10"   SLR in long sitting; 30x    THERAPEUTIC ACTIVITIES to improve dynamic and functional performance for 00 minutes including.  Standing TKE w/ step; RTB; 20 x 5"  Step up; 6" 3 x 10    GAIT TRAINING to improve functional mobility and safety for 00 minutes.     Patient Education and Home Exercises     Home Exercises Provided and Patient Education Provided     Education provided:   - continue HEP, emphasize SLR at EOB    Written Home Exercises Provided: Patient instructed to cont prior HEP. Exercises were reviewed and Douglas was able to demonstrate them prior to the end of the session.  Douglas demonstrated good  understanding of the education provided. See EMR under Patient Instructions for exercises provided during therapy sessions    ASSESSMENT     Douglas responded well to increased mobility for residual muscle soreness as well and continued progressive strengthening. He tolerated direct loading to quad well and without pain or compensation. Will strength test next session to assess limb symmetry.     Douglas Is progressing well towards his goals.   Pt prognosis is Good.     Pt will continue to benefit from skilled outpatient physical therapy to address the deficits listed in the problem list box on initial evaluation, provide pt/family education and to maximize pt's level of independence in the home and community environment.     Pt's spiritual, cultural and educational needs considered and pt agreeable to plan of care and goals.     Anticipated barriers to physical " therapy: none    Goals:   Short Term Goals: 6-8 weeks  1. Pt will be compliant with HEP 50% of prescribed amount. MET  2. The pt to demo improvement in L knee ROM to equal R knee ROM pain free  MET  3.  The pt to demo good quad set with proper hyperextension moment of the L knee  MET  4. The pt to demo ambualting with elast restricitve AD witout major compensatory pattern R knee for at least 20 feet  MET     Long Term Goals: 8-10 weeks   Pt will be compliant with % of prescribed amount.  (Progressing, not met)   Patient will improve their FOTO limitation score to 16% or better as evidence of clinically significant improvements in their function. (Progressing, not met)   The pt to demo tolerance to a squat at or bellow parallel with uncompensated mechanics x10  (Progressing, not met)   The pt to demo strength of L LE within 10% of R LE as demo'd on the isometric testing  (Progressing, not met)   The pt will report full participation in ADLs and IADLs without restrictions related to L knee.  (Progressing, not met)     PLAN     Continue to strengthen quad and normalize gait    Rhoda Mancera, PT, DPT

## 2023-12-12 ENCOUNTER — CLINICAL SUPPORT (OUTPATIENT)
Dept: REHABILITATION | Facility: HOSPITAL | Age: 48
End: 2023-12-12
Payer: COMMERCIAL

## 2023-12-12 DIAGNOSIS — R29.898 DECREASED STRENGTH OF LOWER EXTREMITY: Primary | ICD-10-CM

## 2023-12-12 PROCEDURE — 97140 MANUAL THERAPY 1/> REGIONS: CPT

## 2023-12-12 PROCEDURE — 97110 THERAPEUTIC EXERCISES: CPT

## 2023-12-12 PROCEDURE — 97112 NEUROMUSCULAR REEDUCATION: CPT

## 2023-12-12 PROCEDURE — 97530 THERAPEUTIC ACTIVITIES: CPT

## 2023-12-12 NOTE — PROGRESS NOTES
"[facility]   Physical Therapy Treatment Note     Name: Douglas Escobar  [facility] Number: [id number]    Therapy Diagnosis:   Encounter Diagnosis   Name Primary?    Decreased strength of lower extremity Yes       Physician: Drew Spence PA-C    Visit Date: 2023  Physician Orders: PT Eval and Treat  Medical Diagnosis from Referral: S83.232A (ICD-10-CM) - Complex tear of medial meniscus of left knee as current injury, initial encounter  Evaluation Date: 2023  Authorization Period Expiration: 2023  Plan of Care Expiration: 2024  Progress Note Due: 2024  Visit # / Visits authorized:  (+eval)  FOTO Follow Up: #1 given at [facility]  FOTO Follow UP:      Time In: 0200 pm  Time Out: 0300 pm  Total Billable Time: 60 minutes     DOS: 2023  S/p: 1. Left knee arthroscopic chondroplasty  2. Left knee arthroscopic partial medial meniscectomy     Post-Op Precautions: s/p L medial meniscectomy     Precautions: Standard and post-op    SUBJECTIVE     Pt reports: he feels good and the swelling has improved but is still very focal to his kneecap  He was compliant with home exercise program.  Response to previous treatment: improved crutch use  Functional change: improved gait    Pain: 1/10  Location: left knee      OBJECTIVE     Objective Measures updated at progress report unless specified.     4 weeks and 1 days s/p as of     Range of Motion:   Knee Right  Left    Active 0-0-120 0-0-120   Passive 5-0-130 5-0-130     Tindeq Right Left Affected Limb % symmetry   Quad Isometric at 60 deg flex  42.3 kg  41.8 kg  NT kg    Av.1 37.7 kg  39.9 kg  39.8 kg    Av.1  L 93%         Treatment       Douglas received the treatments listed below:      Therapeutic exercises to develop strength, endurance, ROM, and flexibility for 25 minutes including:  Upright bike: 8 min for tissue extensibility   DL bridges; 3 x 15 x 5"   Hammer strength knee ext; 10#; 3 x 12  Leg Press; " "140#; 4 x 10  Updated tests/ measures    NP Today:   Heel prop: 4 mins  Hinge stretch: 20 x 5" hold  Heel slide to quad set 4 min     Manual therapy techniques: Joint mobilizations and Soft tissue Mobilization were applied to the: L knee for 10 minutes, including:  Patellar mobs Gr 2-3  Fat pad mobs at 0 and 20 deg flex     NEUROMUSCULAR RE-EDUCATION ACTIVITIES to improve Balance, Coordination, Kinesthetic, Sense, Proprioception, and Posture for 10 minutes.  The following were included:   Quad set in heel prop 10 x 10"   SLR in long sitting; 30x    THERAPEUTIC ACTIVITIES to improve dynamic and functional performance for 15 minutes including.  Step up; 12" 3 x 10  DL heel raises; 3 x 15       GAIT TRAINING to improve functional mobility and safety for 00 minutes.     Patient Education and Home Exercises     Home Exercises Provided and Patient Education Provided     Education provided:   - continue HEP, emphasize SLR at EOB    Written Home Exercises Provided: Patient instructed to cont prior HEP. Exercises were reviewed and Douglas was able to demonstrate them prior to the end of the session.  Douglas demonstrated good  understanding of the education provided. See EMR under Patient Instructions for exercises provided during therapy sessions    ASSESSMENT     Douglas demonstrated good strength symmetry upon isometric testing today indicating that his functional deficits are more related to load tolerance and motor control than force production. He will benefit continued ramp up in loading and emphasis on SL motor control as able.     Duoglas Is progressing well towards his goals.   Pt prognosis is Good.     Pt will continue to benefit from skilled outpatient physical therapy to address the deficits listed in the problem list box on initial evaluation, provide pt/family education and to maximize pt's level of independence in the home and community environment.     Pt's spiritual, cultural and educational needs considered and pt " agreeable to plan of care and goals.     Anticipated barriers to physical therapy: none    Goals:   Short Term Goals: 6-8 weeks  1. Pt will be compliant with HEP 50% of prescribed amount. MET  2. The pt to demo improvement in L knee ROM to equal R knee ROM pain free  MET  3.  The pt to demo good quad set with proper hyperextension moment of the L knee  MET  4. The pt to demo ambualting with elast restricitve AD witout major compensatory pattern R knee for at least 20 feet  MET     Long Term Goals: 8-10 weeks   Pt will be compliant with % of prescribed amount.  (Progressing, not met)   Patient will improve their FOTO limitation score to 16% or better as evidence of clinically significant improvements in their function. (Progressing, not met)   The pt to demo tolerance to a squat at or bellow parallel with uncompensated mechanics x10  (Progressing, not met)   The pt to demo strength of L LE within 10% of R LE as demo'd on the isometric testing  (Progressing, not met)   The pt will report full participation in ADLs and IADLs without restrictions related to L knee.  (Progressing, not met)     PLAN     Continue to strengthen quad and normalize gait    Rhoda Mancera, PT, DPT

## 2023-12-19 ENCOUNTER — CLINICAL SUPPORT (OUTPATIENT)
Dept: REHABILITATION | Facility: HOSPITAL | Age: 48
End: 2023-12-19
Payer: COMMERCIAL

## 2023-12-19 DIAGNOSIS — R29.898 DECREASED STRENGTH OF LOWER EXTREMITY: Primary | ICD-10-CM

## 2023-12-19 PROCEDURE — 97530 THERAPEUTIC ACTIVITIES: CPT

## 2023-12-19 PROCEDURE — 97110 THERAPEUTIC EXERCISES: CPT

## 2023-12-19 NOTE — PROGRESS NOTES
"[facility]   Physical Therapy Treatment Note     Name: Duoglas Escobar  [facility] Number: [id number]    Therapy Diagnosis:   Encounter Diagnosis   Name Primary?    Decreased strength of lower extremity Yes       Physician: Drew Spence PA-C    Visit Date: 2023  Physician Orders: PT Eval and Treat  Medical Diagnosis from Referral: S83.232A (ICD-10-CM) - Complex tear of medial meniscus of left knee as current injury, initial encounter  Evaluation Date: 2023  Authorization Period Expiration: 2023  Plan of Care Expiration: 2024  Progress Note Due: 2024  Visit # / Visits authorized:  (+eval)  FOTO Follow Up: #1 given at [facility]  FOTO Follow UP:      Time In: 0100 pm  Time Out: 0200 pm  Total Billable Time: 60 minutes     DOS: 2023  S/p: 1. Left knee arthroscopic chondroplasty  2. Left knee arthroscopic partial medial meniscectomy     Post-Op Precautions: s/p L medial meniscectomy     Precautions: Standard and post-op    SUBJECTIVE     Pt reports: he is able to do more but still has swelling at his anterior kneecap  He was compliant with home exercise program.  Response to previous treatment: improved crutch use  Functional change: improved gait    Pain: 1/10  Location: left knee      OBJECTIVE     Objective Measures updated at progress report unless specified.     5 weeks and 1 days s/p as of     Range of Motion:   Knee Right  Left    Active 0-0-120 0-0-120   Passive 5-0-130 5-0-130     Tindeq Right Left Affected Limb % symmetry   Quad Isometric at 60 deg flex  42.3 kg  41.8 kg  NT kg    Av.1 37.7 kg  39.9 kg  39.8 kg    Av.1  L 93%         Treatment       Douglas received the treatments listed below:      Therapeutic exercises to develop strength, endurance, ROM, and flexibility for 40 minutes including:  Upright bike: 8 min for tissue extensibility   DL bridges; 3 x 15 x 5"   Hammer strength knee ext; 10#; 3 x 12  DL Leg Press; 100#; 2 x " "15  SL Leg Press; 50#; 2 x 15   DL Matrix HS curl; 45# 3 x 15  Updated tests/ measures    NP Today:   Heel prop: 4 mins  Hinge stretch: 20 x 5" hold  Heel slide to quad set 4 min     Manual therapy techniques: Joint mobilizations and Soft tissue Mobilization were applied to the: L knee for 00 minutes, including:  Patellar mobs Gr 2-3  Fat pad mobs at 0 and 20 deg flex     NEUROMUSCULAR RE-EDUCATION ACTIVITIES to improve Balance, Coordination, Kinesthetic, Sense, Proprioception, and Posture for 05 minutes.  The following were included:   Quad set in heel prop 10 x 10"   SLR in long sitting; 30x    THERAPEUTIC ACTIVITIES to improve dynamic and functional performance for 15 minutes including.  Step up w/ 25# farmer carry; 12" 3 x 10  DL heel raises; 3 x 15- NP       GAIT TRAINING to improve functional mobility and safety for 00 minutes.     Patient Education and Home Exercises     Home Exercises Provided and Patient Education Provided     Education provided:   - continue HEP, emphasize SLR at EOB    Written Home Exercises Provided: Patient instructed to cont prior HEP. Exercises were reviewed and Douglas was able to demonstrate them prior to the end of the session.  Douglas demonstrated good  understanding of the education provided. See EMR under Patient Instructions for exercises provided during therapy sessions    ASSESSMENT     Douglas is continuing to progress well in quad control and tolerance to loading but struggles with load management between session and continues to present with increase swelling. He will benefit deloading between sessions to decrease joint irritability.     Douglas Is progressing well towards his goals.   Pt prognosis is Good.     Pt will continue to benefit from skilled outpatient physical therapy to address the deficits listed in the problem list box on initial evaluation, provide pt/family education and to maximize pt's level of independence in the home and community environment.     Pt's " spiritual, cultural and educational needs considered and pt agreeable to plan of care and goals.     Anticipated barriers to physical therapy: none    Goals:   Short Term Goals: 6-8 weeks  1. Pt will be compliant with HEP 50% of prescribed amount. MET  2. The pt to demo improvement in L knee ROM to equal R knee ROM pain free  MET  3.  The pt to demo good quad set with proper hyperextension moment of the L knee  MET  4. The pt to demo ambualting with elast restricitve AD witout major compensatory pattern R knee for at least 20 feet  MET     Long Term Goals: 8-10 weeks   Pt will be compliant with % of prescribed amount.  (Progressing, not met)   Patient will improve their FOTO limitation score to 16% or better as evidence of clinically significant improvements in their function. (Progressing, not met)   The pt to demo tolerance to a squat at or bellow parallel with uncompensated mechanics x10  (Progressing, not met)   The pt to demo strength of L LE within 10% of R LE as demo'd on the isometric testing  (Progressing, not met)   The pt will report full participation in ADLs and IADLs without restrictions related to L knee.  (Progressing, not met)     PLAN     Continue to strengthen quad and normalize gait    Rhoda Mancera, PT, DPT

## 2023-12-28 ENCOUNTER — CLINICAL SUPPORT (OUTPATIENT)
Dept: REHABILITATION | Facility: HOSPITAL | Age: 48
End: 2023-12-28
Payer: COMMERCIAL

## 2023-12-28 DIAGNOSIS — R29.898 DECREASED STRENGTH OF LOWER EXTREMITY: Primary | ICD-10-CM

## 2023-12-28 PROCEDURE — 97110 THERAPEUTIC EXERCISES: CPT

## 2023-12-28 PROCEDURE — 97112 NEUROMUSCULAR REEDUCATION: CPT

## 2023-12-28 NOTE — PROGRESS NOTES
"[facility]   Physical Therapy Treatment Note     Name: Douglas Escobar  [facility] Number: [id number]    Therapy Diagnosis:   Encounter Diagnosis   Name Primary?    Decreased strength of lower extremity Yes       Physician: Drew Spence PA-C    Visit Date: 2023  Physician Orders: PT Eval and Treat  Medical Diagnosis from Referral: S83.232A (ICD-10-CM) - Complex tear of medial meniscus of left knee as current injury, initial encounter  Evaluation Date: 2023  Authorization Period Expiration: 2023  Plan of Care Expiration: 2024  Progress Note Due: 2024  Visit # / Visits authorized:  (+eval)  FOTO Follow Up: #1 given at [facility]  FOTO Follow UP:      Time In: 1400  Time Out:1500  Total Billable Time: 60 minutes     DOS: 2023  S/p: 1. Left knee arthroscopic chondroplasty  2. Left knee arthroscopic partial medial meniscectomy     Post-Op Precautions: s/p L medial meniscectomy     Precautions: Standard and post-op    SUBJECTIVE     Pt reports: his anterior knee is still swollen and his knee is starting to hurt more  He was compliant with home exercise program.  Response to previous treatment: improved crutch use  Functional change: improved gait    Pain: 1/10  Location: left knee      OBJECTIVE     Objective Measures updated at progress report unless specified.     6 weeks and 3 days s/p as of     Range of Motion:   Knee Right  Left    Active 0-0-120 0-0-120   Passive 5-0-130 5-0-130     Tindeq Right Left Affected Limb % symmetry   Quad Isometric at 60 deg flex  42.3 kg  41.8 kg  NT kg    Av.1 37.7 kg  39.9 kg  39.8 kg    Av.1  L 93%         Treatment       Douglas received the treatments listed below:      Therapeutic exercises to develop strength, endurance, ROM, and flexibility for 50 minutes including:  Upright bike: 8 min for tissue extensibility   DL bridges; 3 x 15 x 5"   Hinge stretch: 20 x 5" hold  SAQ 20 x 5"  Prone quad stretch; 3 x " "20"  Natacha HS stretch; 3 x 20"  SL hip abd; 3 x 10      NP Today:   Heel prop: 4 mins  Heel slide to quad set 4 min   Hammer strength knee ext; 10#; 3 x 12  DL Leg Press; 100#; 2 x 15  SL Leg Press; 50#; 2 x 15   DL Matrix HS curl; 45# 3 x 15  Updated tests/ measures    Manual therapy techniques: Joint mobilizations and Soft tissue Mobilization were applied to the: L knee for 00 minutes, including:  Patellar mobs Gr 2-3  Fat pad mobs at 0 and 20 deg flex     NEUROMUSCULAR RE-EDUCATION ACTIVITIES to improve Balance, Coordination, Kinesthetic, Sense, Proprioception, and Posture for 10 minutes.  The following were included:   Quad set in heel prop 10 x 10"   SLR in long sitting; 30x    THERAPEUTIC ACTIVITIES to improve dynamic and functional performance for 00 minutes including.  Step up w/ 25# farmer carry; 12" 3 x 10  DL heel raises; 3 x 15- NP       GAIT TRAINING to improve functional mobility and safety for 00 minutes.     Patient Education and Home Exercises     Home Exercises Provided and Patient Education Provided     Education provided:   - continue HEP, emphasize SLR at EOB    Written Home Exercises Provided: Patient instructed to cont prior HEP. Exercises were reviewed and Douglas was able to demonstrate them prior to the end of the session.  Douglas demonstrated good  understanding of the education provided. See EMR under Patient Instructions for exercises provided during therapy sessions    ASSESSMENT     Douglas continued with increased focal patellar swelling and increased joint irritability. He responded well to deloading in today's session and was instructed again to reach out to physician regarding swelling. He will benefit deloading outside of clinic and continued functional strengthening.     Douglas Is progressing well towards his goals.   Pt prognosis is Good.     Pt will continue to benefit from skilled outpatient physical therapy to address the deficits listed in the problem list box on initial " evaluation, provide pt/family education and to maximize pt's level of independence in the home and community environment.     Pt's spiritual, cultural and educational needs considered and pt agreeable to plan of care and goals.     Anticipated barriers to physical therapy: none    Goals:   Short Term Goals: 6-8 weeks  1. Pt will be compliant with HEP 50% of prescribed amount. MET  2. The pt to demo improvement in L knee ROM to equal R knee ROM pain free  MET  3.  The pt to demo good quad set with proper hyperextension moment of the L knee  MET  4. The pt to demo ambualting with elast restricitve AD witout major compensatory pattern R knee for at least 20 feet  MET     Long Term Goals: 8-10 weeks   Pt will be compliant with % of prescribed amount.  (Progressing, not met)   Patient will improve their FOTO limitation score to 16% or better as evidence of clinically significant improvements in their function. (Progressing, not met)   The pt to demo tolerance to a squat at or bellow parallel with uncompensated mechanics x10  (Progressing, not met)   The pt to demo strength of L LE within 10% of R LE as demo'd on the isometric testing  (Progressing, not met)   The pt will report full participation in ADLs and IADLs without restrictions related to L knee.  (Progressing, not met)     PLAN     Continue to strengthen quad and normalize gait    Rhoda Mancera, PT, DPT

## 2024-01-02 ENCOUNTER — CLINICAL SUPPORT (OUTPATIENT)
Dept: REHABILITATION | Facility: HOSPITAL | Age: 49
End: 2024-01-02
Payer: COMMERCIAL

## 2024-01-02 DIAGNOSIS — R29.898 DECREASED STRENGTH OF LOWER EXTREMITY: Primary | ICD-10-CM

## 2024-01-02 PROCEDURE — 97112 NEUROMUSCULAR REEDUCATION: CPT

## 2024-01-02 PROCEDURE — 97110 THERAPEUTIC EXERCISES: CPT

## 2024-01-02 PROCEDURE — 97530 THERAPEUTIC ACTIVITIES: CPT

## 2024-01-02 NOTE — PROGRESS NOTES
"[facility]   Physical Therapy Treatment Note     Name: Douglas Escobar  [facility] Number: [id number]    Therapy Diagnosis:   Encounter Diagnosis   Name Primary?    Decreased strength of lower extremity Yes       Physician: Drew Spence PA-C    Visit Date: 2024  Physician Orders: PT Eval and Treat  Medical Diagnosis from Referral: S83.232A (ICD-10-CM) - Complex tear of medial meniscus of left knee as current injury, initial encounter  Evaluation Date: 2023  Authorization Period Expiration: 2023  Plan of Care Expiration: 2024  Progress Note Due: 2024  Visit # / Visits authorized:  (+9)  FOTO Follow Up: #1 given at [facility]  FOTO Follow UP:      Time In: 0100 pm  Time Out: 0200 pm  Total Billable Time: 60 minutes     DOS: 2023  S/p: 1. Left knee arthroscopic chondroplasty  2. Left knee arthroscopic partial medial meniscectomy     Post-Op Precautions: s/p L medial meniscectomy     Precautions: Standard and post-op    SUBJECTIVE     Pt reports: he took is easier the last few days and his knee feels better  He was compliant with home exercise program.  Response to previous treatment: improved crutch use  Functional change: improved gait    Pain: 1/10  Location: left knee      OBJECTIVE     Objective Measures updated at progress report unless specified.     7 weeks and 2 days s/p as of     Range of Motion:   Knee Right  Left    Active 0-0-120 0-0-120   Passive 5-0-130 5-0-130     Tindeq Right Left Affected Limb % symmetry   Quad Isometric at 60 deg flex  42.3 kg  41.8 kg  NT kg    Av.1 37.7 kg  39.9 kg  39.8 kg    Av.1  L 93%         Treatment       Douglas received the treatments listed below:      Therapeutic exercises to develop strength, endurance, ROM, and flexibility for 30 minutes including:  Upright bike: 8 min for tissue extensibility   Hinge stretch: 20 x 5" hold  DL Shuttle Leg Press; 100#; 4 x 10      NP Today:   Heel prop: 4 mins  Heel " "slide to quad set 4 min   Hammer strength knee ext; 10#; 3 x 12  SL Shuttle Leg Press; 50#; 3 x 15  DL Matrix HS curl; 45# 3 x 15  DL bridges; 3 x 15 x 5  Updated tests/ measures  Prone quad stretch; 3 x 20"  Natacha HS stretch; 3 x 20"  SL hip abd; 3 x 10    Manual therapy techniques: Joint mobilizations and Soft tissue Mobilization were applied to the: L knee for 00 minutes, including:  Patellar mobs Gr 2-3  Fat pad mobs at 0 and 20 deg flex     NEUROMUSCULAR RE-EDUCATION ACTIVITIES to improve Balance, Coordination, Kinesthetic, Sense, Proprioception, and Posture for 10 minutes.  The following were included:   Quad set in heel prop 10 x 10"   SLR in long sitting; 3 x 10    THERAPEUTIC ACTIVITIES to improve dynamic and functional performance for 20 minutes including.  Step up to SLS; 6" 3 x 10  DL heel raises; 20 x 5"   Box Squat; GTB; 3 x 10       GAIT TRAINING to improve functional mobility and safety for 00 minutes.     Patient Education and Home Exercises     Home Exercises Provided and Patient Education Provided     Education provided:   - continue HEP, emphasize SLR at EOB    Written Home Exercises Provided: Patient instructed to cont prior HEP. Exercises were reviewed and Douglas was able to demonstrate them prior to the end of the session.  Douglas demonstrated good  understanding of the education provided. See EMR under Patient Instructions for exercises provided during therapy sessions    ASSESSMENT     Douglas demonstrated improved knee irritability today and good tolerance to return to functional loading. He struggled to avoid dynamic valgus in step up but improved with tactile cues. He will benefit continued work to avoid femoral adduction and internal rotation in DL and SL exercises.     Douglas Is progressing well towards his goals.   Pt prognosis is Good.     Pt will continue to benefit from skilled outpatient physical therapy to address the deficits listed in the problem list box on initial evaluation, " provide pt/family education and to maximize pt's level of independence in the home and community environment.     Pt's spiritual, cultural and educational needs considered and pt agreeable to plan of care and goals.     Anticipated barriers to physical therapy: none    Goals:   Short Term Goals: 6-8 weeks  1. Pt will be compliant with HEP 50% of prescribed amount. MET  2. The pt to demo improvement in L knee ROM to equal R knee ROM pain free  MET  3.  The pt to demo good quad set with proper hyperextension moment of the L knee  MET  4. The pt to demo ambualting with elast restricitve AD witout major compensatory pattern R knee for at least 20 feet  MET     Long Term Goals: 8-10 weeks   Pt will be compliant with % of prescribed amount.  (Progressing, not met)   Patient will improve their FOTO limitation score to 16% or better as evidence of clinically significant improvements in their function. (Progressing, not met)   The pt to demo tolerance to a squat at or bellow parallel with uncompensated mechanics x10  (Progressing, not met)   The pt to demo strength of L LE within 10% of R LE as demo'd on the isometric testing  (Progressing, not met)   The pt will report full participation in ADLs and IADLs without restrictions related to L knee.  (Progressing, not met)     PLAN     Continue to strengthen quad and normalize gait    Rhoda Mancera, PT, DPT

## 2024-01-04 ENCOUNTER — OFFICE VISIT (OUTPATIENT)
Dept: SPORTS MEDICINE | Facility: CLINIC | Age: 49
End: 2024-01-04
Payer: COMMERCIAL

## 2024-01-04 VITALS
BODY MASS INDEX: 29.53 KG/M2 | HEIGHT: 76 IN | DIASTOLIC BLOOD PRESSURE: 100 MMHG | SYSTOLIC BLOOD PRESSURE: 154 MMHG | WEIGHT: 242.5 LBS | HEART RATE: 78 BPM

## 2024-01-04 DIAGNOSIS — Z98.890 S/P ARTHROSCOPIC SURGERY OF LEFT KNEE: Primary | ICD-10-CM

## 2024-01-04 PROCEDURE — 1159F MED LIST DOCD IN RCRD: CPT | Mod: CPTII,S$GLB,, | Performed by: ORTHOPAEDIC SURGERY

## 2024-01-04 PROCEDURE — 99999 PR PBB SHADOW E&M-EST. PATIENT-LVL III: CPT | Mod: PBBFAC,,, | Performed by: ORTHOPAEDIC SURGERY

## 2024-01-04 PROCEDURE — 99024 POSTOP FOLLOW-UP VISIT: CPT | Mod: S$GLB,,, | Performed by: ORTHOPAEDIC SURGERY

## 2024-01-04 PROCEDURE — 3077F SYST BP >= 140 MM HG: CPT | Mod: CPTII,S$GLB,, | Performed by: ORTHOPAEDIC SURGERY

## 2024-01-04 PROCEDURE — 3080F DIAST BP >= 90 MM HG: CPT | Mod: CPTII,S$GLB,, | Performed by: ORTHOPAEDIC SURGERY

## 2024-01-04 NOTE — PROGRESS NOTES
"CC: Left knee scope post op 6 weeks    Patient is here for his 6 week post op appointment s/p below and is doing well. Patient is doing PT at [facility] and is progressing well.     DATE OF PROCEDURE:  11/13/2023      PREOPERATIVE DIAGNOSES:   1. Left knee chondromalacia  2. Left knee medial meniscus tear.      POSTOPERATIVE DIAGNOSES:   1. Left knee chondromalacia  2. Left knee medial meniscus tear.      PROCEDURES:   1. Left knee arthroscopic chondroplasty  2. Left knee arthroscopic partial medial meniscectomy     SURGEON: Douglas Vale M.D.     PE:    BP (!) 154/100   Pulse 78   Ht 6' 4" (1.93 m)   Wt 110 kg (242 lb 8.1 oz)   BMI 29.52 kg/m²      Left knee:    Incisions clean/dry/intact  No sign of infection  Mild swelling  Compartments soft  Neurovascular status intact in extremity    AROM 0 to 130 degrees  Decreased quad strength      Assessment:  6 weeks s/p left knee arthroscopic chondroplasty and partial medial meniscectomy    Plan:  1. Finish PT    2. F/u PRN       All questions were answered. Instructed patient to call with questions or concerns in the interim.       "

## 2024-01-09 ENCOUNTER — CLINICAL SUPPORT (OUTPATIENT)
Dept: REHABILITATION | Facility: HOSPITAL | Age: 49
End: 2024-01-09
Payer: COMMERCIAL

## 2024-01-09 DIAGNOSIS — R29.898 DECREASED STRENGTH OF LOWER EXTREMITY: Primary | ICD-10-CM

## 2024-01-09 PROCEDURE — 97530 THERAPEUTIC ACTIVITIES: CPT

## 2024-01-09 PROCEDURE — 97110 THERAPEUTIC EXERCISES: CPT

## 2024-01-09 PROCEDURE — 97112 NEUROMUSCULAR REEDUCATION: CPT

## 2024-01-09 NOTE — PROGRESS NOTES
"[facility]   Physical Therapy Treatment Note     Name: Douglas Escobar  [facility] Number: [id number]    Therapy Diagnosis:   Encounter Diagnosis   Name Primary?    Decreased strength of lower extremity Yes     Physician: Drew Spence PA-C    Visit Date: 2024  Physician Orders: PT Eval and Treat  Medical Diagnosis from Referral: S83.232A (ICD-10-CM) - Complex tear of medial meniscus of left knee as current injury, initial encounter  Evaluation Date: 2023  Authorization Period Expiration: 2023  Plan of Care Expiration: 2024  Progress Note Due: 2024  Visit # / Visits authorized:  (+9)  FOTO Follow Up: #1 given at [facility]  FOTO Follow UP:      Time In: 0100 pm  Time Out: 0200 pm  Total Billable Time: 60 minutes     DOS: 2023  S/p: 1. Left knee arthroscopic chondroplasty  2. Left knee arthroscopic partial medial meniscectomy     Post-Op Precautions: s/p L medial meniscectomy     Precautions: Standard and post-op    SUBJECTIVE     Pt reports: his knee has been feeling much better, MD didn't drain it b/c it wasn't swollen enough  He was compliant with home exercise program.  Response to previous treatment: improved crutch use  Functional change: improved gait    Pain: 1/10  Location: left knee      OBJECTIVE     Objective Measures updated at progress report unless specified.     8 weeks and 2 days s/p as of     Range of Motion:   Knee Right  Left    Active 0-0-120 0-0-120   Passive 5-0-130 5-0-130     Tindeq Right Left Affected Limb % symmetry   Quad Isometric at 60 deg flex  42.3 kg  41.8 kg  NT kg    Av.1 37.7 kg  39.9 kg  39.8 kg    Av.1  L 93%         Treatment       Douglas received the treatments listed below:      Therapeutic exercises to develop strength, endurance, ROM, and flexibility for 20 minutes including:  Upright bike: 8 min for tissue extensibility   Hinge stretch: 20 x 5" hold  SL Leg Press; 80#; 4 x 10  Hammer strength knee ext; " "10#; 3 x 12    NP Today:   Heel prop: 4 mins  Heel slide to quad set 4 min   DL Matrix HS curl; 45# 3 x 15  DL bridges; 3 x 15 x 5  Updated tests/ measures  Prone quad stretch; 3 x 20"  Natacha HS stretch; 3 x 20"  SL hip abd; 3 x 10    Manual therapy techniques: Joint mobilizations and Soft tissue Mobilization were applied to the: L knee for 00 minutes, including:  Patellar mobs Gr 2-3  Fat pad mobs at 0 and 20 deg flex     NEUROMUSCULAR RE-EDUCATION ACTIVITIES to improve Balance, Coordination, Kinesthetic, Sense, Proprioception, and Posture for 10 minutes.  The following were included:   Quad set in heel prop 10 x 10"   SLR in long sitting; 3 x 10  Wall Sit; 4 x 20"    THERAPEUTIC ACTIVITIES to improve dynamic and functional performance for 30 minutes including.  Step up to SLS; 6" 3 x 10; 20#  SL Box Squat; 24"; 3 x 10  SLS 4 x 20"         GAIT TRAINING to improve functional mobility and safety for 00 minutes.       Patient Education and Home Exercises     Home Exercises Provided and Patient Education Provided     Education provided:   - continue HEP, emphasize SLR at EOB    Written Home Exercises Provided: Patient instructed to cont prior HEP. Exercises were reviewed and Douglas was able to demonstrate them prior to the end of the session.  Douglas demonstrated good  understanding of the education provided. See EMR under Patient Instructions for exercises provided during therapy sessions    ASSESSMENT     Douglas is continuing to respond well to progressive loading with decreased joint irritability. He struggled with proprioception and balance today but improved compared to previous sessions. Will continue to progress as able.     Douglas Is progressing well towards his goals.   Pt prognosis is Good.     Pt will continue to benefit from skilled outpatient physical therapy to address the deficits listed in the problem list box on initial evaluation, provide pt/family education and to maximize pt's level of independence in " the home and community environment.     Pt's spiritual, cultural and educational needs considered and pt agreeable to plan of care and goals.     Anticipated barriers to physical therapy: none    Goals:   Short Term Goals: 6-8 weeks  1. Pt will be compliant with HEP 50% of prescribed amount. MET  2. The pt to demo improvement in L knee ROM to equal R knee ROM pain free  MET  3.  The pt to demo good quad set with proper hyperextension moment of the L knee  MET  4. The pt to demo ambualting with least restricitve AD witout major compensatory pattern R knee for at least 20 feet  MET     Long Term Goals: 8-10 weeks   Pt will be compliant with % of prescribed amount.  (Progressing, not met)   Patient will improve their FOTO limitation score to 16% or better as evidence of clinically significant improvements in their function. (Progressing, not met)   The pt to demo tolerance to a squat at or bellow parallel with uncompensated mechanics x10  (Progressing, not met)   The pt to demo strength of L LE within 10% of R LE as demo'd on the isometric testing  (Progressing, not met)   The pt will report full participation in ADLs and IADLs without restrictions related to L knee.  (Progressing, not met)     PLAN     Continue to strengthen quad and normalize gait    Rhoda Mancera, PT, DPT

## (undated) DEVICE — WRAP KNEE ACCU THERM GEL PACK

## (undated) DEVICE — TUBE SET INFLOW/OUTFLOW

## (undated) DEVICE — PAD ABD 8X10 STERILE

## (undated) DEVICE — GLOVE SENSICARE PI SURG 8

## (undated) DEVICE — DRESSING XEROFORM NONADH 1X8IN

## (undated) DEVICE — UNDERGLOVES BIOGEL PI SIZE 7.5

## (undated) DEVICE — ADHESIVE MASTISOL VIAL 48/BX

## (undated) DEVICE — STRIP MEDI WND CLSR 1/2X4IN

## (undated) DEVICE — SOL NACL IRR 1000ML BTL

## (undated) DEVICE — DRAPE TOP 53X102IN

## (undated) DEVICE — COVER CAMERA OPERATING ROOM

## (undated) DEVICE — SUT MONOCRYL 4-0 PS-2

## (undated) DEVICE — DRESSING XEROFORM FOIL PK 1X8

## (undated) DEVICE — COVER LIGHT HANDLE 80/CA

## (undated) DEVICE — PAD CAST SPECIALIST STRL 6

## (undated) DEVICE — PAD ABDOMINAL STERILE 8X10IN

## (undated) DEVICE — CLOSURE SKIN STERI STRIP 1/2X4

## (undated) DEVICE — DRAPE ARTHSCP FLD CTRL POUCH

## (undated) DEVICE — Device

## (undated) DEVICE — GOWN ECLIPSE REINF LV4 XLNG XL

## (undated) DEVICE — GLOVE SENSICARE PI GRN 8

## (undated) DEVICE — ELECTRODE 90 DEGREE ANGLE

## (undated) DEVICE — TOURNIQUET SB QC DP 34X4IN

## (undated) DEVICE — SOL NACL IRR 3000ML

## (undated) DEVICE — GAUZE SPONGE 4X4 12PLY

## (undated) DEVICE — BLADE SHAVER LANZA 4.2X13CM

## (undated) DEVICE — PAD ELECTRODE STER 1.5X3

## (undated) DEVICE — DRAPE STERI U-SHAPED 47X51IN